# Patient Record
Sex: MALE | Race: WHITE | NOT HISPANIC OR LATINO | Employment: UNEMPLOYED | URBAN - METROPOLITAN AREA
[De-identification: names, ages, dates, MRNs, and addresses within clinical notes are randomized per-mention and may not be internally consistent; named-entity substitution may affect disease eponyms.]

---

## 2019-08-11 ENCOUNTER — HOSPITAL ENCOUNTER (EMERGENCY)
Facility: HOSPITAL | Age: 54
Discharge: HOME | End: 2019-08-11
Attending: EMERGENCY MEDICINE | Admitting: EMERGENCY MEDICINE
Payer: COMMERCIAL

## 2019-08-11 VITALS
HEART RATE: 62 BPM | OXYGEN SATURATION: 97 % | DIASTOLIC BLOOD PRESSURE: 83 MMHG | SYSTOLIC BLOOD PRESSURE: 146 MMHG | BODY MASS INDEX: 26.68 KG/M2 | RESPIRATION RATE: 23 BRPM | HEIGHT: 62 IN | TEMPERATURE: 99.2 F | WEIGHT: 145 LBS

## 2019-08-11 DIAGNOSIS — Z91.14 NON COMPLIANCE W MEDICATION REGIMEN: ICD-10-CM

## 2019-08-11 DIAGNOSIS — G40.919 BREAKTHROUGH SEIZURE (HCC): Primary | ICD-10-CM

## 2019-08-11 LAB
ALBUMIN SERPL BCP-MCNC: 3.9 G/DL (ref 3.5–5)
ALP SERPL-CCNC: 124 U/L (ref 46–116)
ALT SERPL W P-5'-P-CCNC: 21 U/L (ref 12–78)
ANION GAP SERPL CALCULATED.3IONS-SCNC: 10 MMOL/L (ref 4–13)
AST SERPL W P-5'-P-CCNC: 35 U/L (ref 5–45)
BASOPHILS # BLD AUTO: 0.04 THOUSANDS/ΜL (ref 0–0.1)
BASOPHILS NFR BLD AUTO: 1 % (ref 0–1)
BILIRUB SERPL-MCNC: 0.4 MG/DL (ref 0.2–1)
BUN SERPL-MCNC: 9 MG/DL (ref 5–25)
CALCIUM SERPL-MCNC: 9.2 MG/DL (ref 8.3–10.1)
CARBAMAZEPINE SERPL-MCNC: 1.2 UG/ML (ref 4–12)
CHLORIDE SERPL-SCNC: 102 MMOL/L (ref 100–108)
CO2 SERPL-SCNC: 28 MMOL/L (ref 21–32)
CREAT SERPL-MCNC: 0.92 MG/DL (ref 0.6–1.3)
EOSINOPHIL # BLD AUTO: 0.24 THOUSAND/ΜL (ref 0–0.61)
EOSINOPHIL NFR BLD AUTO: 4 % (ref 0–6)
ERYTHROCYTE [DISTWIDTH] IN BLOOD BY AUTOMATED COUNT: 13.4 % (ref 11.6–15.1)
GFR SERPL CREATININE-BSD FRML MDRD: 95 ML/MIN/1.73SQ M
GLUCOSE SERPL-MCNC: 115 MG/DL (ref 65–140)
HCT VFR BLD AUTO: 39.7 % (ref 36.5–49.3)
HGB BLD-MCNC: 13.3 G/DL (ref 12–17)
IMM GRANULOCYTES # BLD AUTO: 0.02 THOUSAND/UL (ref 0–0.2)
IMM GRANULOCYTES NFR BLD AUTO: 0 % (ref 0–2)
LYMPHOCYTES # BLD AUTO: 2.67 THOUSANDS/ΜL (ref 0.6–4.47)
LYMPHOCYTES NFR BLD AUTO: 41 % (ref 14–44)
MCH RBC QN AUTO: 34.5 PG (ref 26.8–34.3)
MCHC RBC AUTO-ENTMCNC: 33.5 G/DL (ref 31.4–37.4)
MCV RBC AUTO: 103 FL (ref 82–98)
MONOCYTES # BLD AUTO: 0.52 THOUSAND/ΜL (ref 0.17–1.22)
MONOCYTES NFR BLD AUTO: 8 % (ref 4–12)
NEUTROPHILS # BLD AUTO: 3.06 THOUSANDS/ΜL (ref 1.85–7.62)
NEUTS SEG NFR BLD AUTO: 46 % (ref 43–75)
NRBC BLD AUTO-RTO: 0 /100 WBCS
PHENOBARB SERPL-MCNC: 18.5 UG/ML (ref 15–40)
PLATELET # BLD AUTO: 243 THOUSANDS/UL (ref 149–390)
PMV BLD AUTO: 10.7 FL (ref 8.9–12.7)
POTASSIUM SERPL-SCNC: 4.2 MMOL/L (ref 3.5–5.3)
PROT SERPL-MCNC: 7.7 G/DL (ref 6.4–8.2)
RBC # BLD AUTO: 3.85 MILLION/UL (ref 3.88–5.62)
SODIUM SERPL-SCNC: 140 MMOL/L (ref 136–145)
WBC # BLD AUTO: 6.55 THOUSAND/UL (ref 4.31–10.16)

## 2019-08-11 PROCEDURE — 36415 COLL VENOUS BLD VENIPUNCTURE: CPT | Performed by: PHYSICIAN ASSISTANT

## 2019-08-11 PROCEDURE — 93005 ELECTROCARDIOGRAM TRACING: CPT

## 2019-08-11 PROCEDURE — 96374 THER/PROPH/DIAG INJ IV PUSH: CPT

## 2019-08-11 PROCEDURE — 96361 HYDRATE IV INFUSION ADD-ON: CPT

## 2019-08-11 PROCEDURE — 80053 COMPREHEN METABOLIC PANEL: CPT | Performed by: PHYSICIAN ASSISTANT

## 2019-08-11 PROCEDURE — 99284 EMERGENCY DEPT VISIT MOD MDM: CPT | Performed by: PHYSICIAN ASSISTANT

## 2019-08-11 PROCEDURE — 99284 EMERGENCY DEPT VISIT MOD MDM: CPT

## 2019-08-11 PROCEDURE — 85025 COMPLETE CBC W/AUTO DIFF WBC: CPT | Performed by: PHYSICIAN ASSISTANT

## 2019-08-11 PROCEDURE — 80156 ASSAY CARBAMAZEPINE TOTAL: CPT | Performed by: PHYSICIAN ASSISTANT

## 2019-08-11 PROCEDURE — 80184 ASSAY OF PHENOBARBITAL: CPT | Performed by: PHYSICIAN ASSISTANT

## 2019-08-11 RX ORDER — LOSARTAN POTASSIUM 50 MG/1
50 TABLET ORAL DAILY
Qty: 7 TABLET | Refills: 0 | Status: SHIPPED | OUTPATIENT
Start: 2019-08-11 | End: 2019-09-16 | Stop reason: SDUPTHER

## 2019-08-11 RX ORDER — ACETAMINOPHEN 325 MG/1
975 TABLET ORAL ONCE
Status: COMPLETED | OUTPATIENT
Start: 2019-08-11 | End: 2019-08-11

## 2019-08-11 RX ORDER — PHENOBARBITAL SODIUM 65 MG/ML
130 INJECTION INTRAMUSCULAR ONCE
Status: COMPLETED | OUTPATIENT
Start: 2019-08-11 | End: 2019-08-11

## 2019-08-11 RX ORDER — CARBAMAZEPINE 100 MG/1
100 TABLET, EXTENDED RELEASE ORAL 2 TIMES DAILY
COMMUNITY
End: 2019-08-11 | Stop reason: SDUPTHER

## 2019-08-11 RX ORDER — LOSARTAN POTASSIUM 50 MG/1
50 TABLET ORAL DAILY
COMMUNITY
End: 2019-08-11 | Stop reason: SDUPTHER

## 2019-08-11 RX ORDER — PHENOBARBITAL 97.2 MG/1
97.2 TABLET ORAL 2 TIMES DAILY
Qty: 14 TABLET | Refills: 0 | Status: SHIPPED | OUTPATIENT
Start: 2019-08-11 | End: 2019-09-16 | Stop reason: SDUPTHER

## 2019-08-11 RX ORDER — LOSARTAN POTASSIUM 50 MG/1
50 TABLET ORAL ONCE
Status: COMPLETED | OUTPATIENT
Start: 2019-08-11 | End: 2019-08-11

## 2019-08-11 RX ORDER — CARBAMAZEPINE 100 MG/1
100 TABLET, EXTENDED RELEASE ORAL 2 TIMES DAILY
Qty: 14 TABLET | Refills: 0 | Status: SHIPPED | OUTPATIENT
Start: 2019-08-11 | End: 2019-09-16 | Stop reason: SDUPTHER

## 2019-08-11 RX ORDER — CARBAMAZEPINE 200 MG/1
200 TABLET ORAL ONCE
Status: COMPLETED | OUTPATIENT
Start: 2019-08-11 | End: 2019-08-11

## 2019-08-11 RX ORDER — PHENOBARBITAL 97.2 MG/1
97.2 TABLET ORAL 2 TIMES DAILY
COMMUNITY
End: 2019-08-11 | Stop reason: SDUPTHER

## 2019-08-11 RX ADMIN — PHENOBARBITAL SODIUM 130 MG: 65 INJECTION INTRAMUSCULAR; INTRAVENOUS at 19:06

## 2019-08-11 RX ADMIN — SODIUM CHLORIDE 1000 ML: 0.9 INJECTION, SOLUTION INTRAVENOUS at 18:16

## 2019-08-11 RX ADMIN — CARBAMAZEPINE 200 MG: 200 TABLET ORAL at 19:05

## 2019-08-11 RX ADMIN — ACETAMINOPHEN 975 MG: 325 TABLET, FILM COATED ORAL at 18:19

## 2019-08-11 RX ADMIN — LOSARTAN POTASSIUM 50 MG: 50 TABLET, FILM COATED ORAL at 20:12

## 2019-08-11 NOTE — ED PROVIDER NOTES
History  Chief Complaint   Patient presents with    Seizure - Prior Hx Of     Alma Rosa noted that  he had a seizure lasting 10 minutes, Patient states he ran out of his tegretol, phenobarbitol, and losartin  This the first seizure since about a year ago     47 yo male w/ hx of HTN and seizures presents to the Emergency Department for evaluation of breakthrough seizure  He has been out of carbamazepine and phenobarbitol for 2 days as his meds were not properly refilled  Seizure was witnessed by family, described as confusion with staggering gait, no definitive tonic/clonic activity  Currently reports headache, no other symptoms  Does not have a neurologist  Resides in [place], [place], here for a few days for work  No current chest pain, vision changes, N/V, or myalgia  Denies illicit substance use  Prior to Admission Medications   Prescriptions Last Dose Informant Patient Reported? Taking? PHENobarbital (LUMINAL) 97 2 MG tablet   Yes Yes   Sig: Take 97 2 mg by mouth 2 (two) times a day   PHENobarbital (LUMINAL) 97 2 MG tablet   No No   Sig: Take 1 tablet (97 2 mg total) by mouth 2 (two) times a day for 7 days   carBAMazepine (TEGretol XR) 100 mg 12 hr tablet   Yes Yes   Sig: Take 100 mg by mouth 2 (two) times a day   carBAMazepine (TEGretol XR) 100 mg 12 hr tablet   No No   Sig: Take 1 tablet (100 mg total) by mouth 2 (two) times a day for 7 days   losartan (COZAAR) 50 mg tablet   Yes Yes   Sig: Take 50 mg by mouth daily   losartan (COZAAR) 50 mg tablet   No No   Sig: Take 1 tablet (50 mg total) by mouth daily for 7 days      Facility-Administered Medications: None       Past Medical History:   Diagnosis Date    Hypertension     Seizures (HCC)        Past Surgical History:   Procedure Laterality Date    KNEE ARTHROSCOPY Right        History reviewed  No pertinent family history  I have reviewed and agree with the history as documented      Social History     Tobacco Use    Smoking status: Current Some Day Smoker    Smokeless tobacco: Never Used   Substance Use Topics    Alcohol use: Never     Frequency: Never    Drug use: Never        Review of Systems   Constitutional: Negative for chills, diaphoresis and fever  Eyes: Negative for visual disturbance  Respiratory: Negative for cough and shortness of breath  Cardiovascular: Negative for chest pain and palpitations  Gastrointestinal: Negative for abdominal pain, diarrhea, nausea and vomiting  Genitourinary: Negative for dysuria, flank pain and frequency  Musculoskeletal: Negative for arthralgias and myalgias  Skin: Negative for color change, rash and wound  Allergic/Immunologic: Negative for immunocompromised state  Neurological: Positive for seizures and headaches  Negative for dizziness, weakness and light-headedness  Hematological: Does not bruise/bleed easily  Psychiatric/Behavioral: Negative for confusion  The patient is not nervous/anxious  Physical Exam  Physical Exam   Constitutional: He is oriented to person, place, and time  He appears well-developed and well-nourished  No distress  HENT:   Head: Normocephalic and atraumatic  Eyes: Pupils are equal, round, and reactive to light  No scleral icterus  Neck: No JVD present  Cardiovascular: Normal rate and regular rhythm  Exam reveals no gallop and no friction rub  No murmur heard  Pulmonary/Chest: No respiratory distress  He has no wheezes  He has no rales  Abdominal: Soft  Bowel sounds are normal  He exhibits no distension and no mass  There is no tenderness  There is no guarding  Neurological: He is alert and oriented to person, place, and time  No cranial nerve deficit  Cerebellar normal, no DDK or dysmetria  BUE and BLE sensation and strength intact in all fields and symmetric   Skin: Skin is warm and dry  Capillary refill takes less than 2 seconds  He is not diaphoretic  Psychiatric: He has a normal mood and affect   His behavior is normal    Vitals reviewed        Vital Signs  ED Triage Vitals [08/11/19 1804]   Temperature Pulse Respirations Blood Pressure SpO2   99 2 °F (37 3 °C) 85 20 (!) 190/96 96 %      Temp src Heart Rate Source Patient Position - Orthostatic VS BP Location FiO2 (%)   -- -- -- -- --      Pain Score       7           Vitals:    08/11/19 1845 08/11/19 1900 08/11/19 1945 08/11/19 2000   BP: (!) 174/92 170/83 160/92 146/83   Pulse: 67 80 64 62         Visual Acuity  Visual Acuity      Most Recent Value   L Pupil Size (mm)  4   R Pupil Size (mm)  4          ED Medications  Medications   sodium chloride 0 9 % bolus 1,000 mL (0 mL Intravenous Stopped 8/11/19 2011)   acetaminophen (TYLENOL) tablet 975 mg (975 mg Oral Given 8/11/19 1819)   PHENobarbital 65 mg/mL injection 130 mg (130 mg Intravenous Given 8/11/19 1906)   carBAMazepine (TEGretol) tablet 200 mg (200 mg Oral Given 8/11/19 1905)   losartan (COZAAR) tablet 50 mg (50 mg Oral Given 8/11/19 2012)       Diagnostic Studies  Results Reviewed     Procedure Component Value Units Date/Time    Phenobarbital level [[id number]]  (Normal) Collected:  08/11/19 1811    Lab Status:  Final result Specimen:  Blood from Arm, Left Updated:  08/11/19 2133     Phenobarbital Lvl 18 5 ug/mL     Comprehensive metabolic panel [[id number]]  (Abnormal) Collected:  08/11/19 1811    Lab Status:  Final result Specimen:  Blood from Arm, Left Updated:  08/11/19 1834     Sodium 140 mmol/L      Potassium 4 2 mmol/L      Chloride 102 mmol/L      CO2 28 mmol/L      ANION GAP 10 mmol/L      BUN 9 mg/dL      Creatinine 0 92 mg/dL      Glucose 115 mg/dL      Calcium 9 2 mg/dL      AST 35 U/L      ALT 21 U/L      Alkaline Phosphatase 124 U/L      Total Protein 7 7 g/dL      Albumin 3 9 g/dL      Total Bilirubin 0 40 mg/dL      eGFR 95 ml/min/1 73sq m     Narrative:       [place] guidelines for Chronic Kidney Disease (CKD):     Stage 1 with normal or high GFR (GFR > 90 mL/min/1 73 square meters)   Stage 2 Mild CKD (GFR = 60-89 mL/min/1 73 square meters)    Stage 3A Moderate CKD (GFR = 45-59 mL/min/1 73 square meters)    Stage 3B Moderate CKD (GFR = 30-44 mL/min/1 73 square meters)    Stage 4 Severe CKD (GFR = 15-29 mL/min/1 73 square meters)    Stage 5 End Stage CKD (GFR <15 mL/min/1 73 square meters)  Note: GFR calculation is accurate only with a steady state creatinine    Carbamazepine level, total [[id number]]  (Abnormal) Collected:  08/11/19 1811    Lab Status:  Final result Specimen:  Blood from Arm, Left Updated:  08/11/19 1834     Carbamazepine Lvl 1 2 ug/mL     CBC and differential [[id number]]  (Abnormal) Collected:  08/11/19 1811    Lab Status:  Final result Specimen:  Blood from Arm, Left Updated:  08/11/19 1818     WBC 6 55 Thousand/uL      RBC 3 85 Million/uL      Hemoglobin 13 3 g/dL      Hematocrit 39 7 %       fL      MCH 34 5 pg      MCHC 33 5 g/dL      RDW 13 4 %      MPV 10 7 fL      Platelets 303 Thousands/uL      nRBC 0 /100 WBCs      Neutrophils Relative 46 %      Immat GRANS % 0 %      Lymphocytes Relative 41 %      Monocytes Relative 8 %      Eosinophils Relative 4 %      Basophils Relative 1 %      Neutrophils Absolute 3 06 Thousands/µL      Immature Grans Absolute 0 02 Thousand/uL      Lymphocytes Absolute 2 67 Thousands/µL      Monocytes Absolute 0 52 Thousand/µL      Eosinophils Absolute 0 24 Thousand/µL      Basophils Absolute 0 04 Thousands/µL                  No orders to display              Procedures  ECG 12 Lead Documentation Only  Date/Time: 8/11/2019 6:20 PM  Performed by: Radha Pressley PA-C  Authorized by: Radha Pressley PA-C     Indications / Diagnosis:  Seizure  ECG reviewed by me, the ED Provider: yes    Patient location:  ED  Previous ECG:     Previous ECG:  Unavailable  Interpretation:     Interpretation: normal    Rate:     ECG rate:  77    ECG rate assessment: normal    Rhythm:     Rhythm: sinus rhythm      Rhythm comment:  Sinus arrhythmia  Ectopy: Ectopy: none    QRS:     QRS axis:  Normal    QRS intervals:  Normal  Conduction:     Conduction: normal    ST segments:     ST segments:  Normal  T waves:     T waves: normal             ED Course                               MDM  Number of Diagnoses or Management Options  Breakthrough seizure ([facility] [address] ): new and requires workup  Non compliance w medication regimen: new and requires workup  Diagnosis management comments: 47 yo male presents w/ breakthrough seizure secondary to medication non compliance  Given loading dose of phenobarb in the ED as well as daily dose of Tegretol, will discharge with 1 week of medication refill  No seizure activity observed in the ED  Amount and/or Complexity of Data Reviewed  Clinical lab tests: ordered and reviewed  Tests in the medicine section of CPT®: ordered and reviewed  Review and summarize past medical records: yes  Independent visualization of images, tracings, or specimens: yes        Disposition  Final diagnoses:   Breakthrough seizure ([facility] [address] )   Non compliance w medication regimen     Time reflects when diagnosis was documented in both MDM as applicable and the Disposition within this note     Time User Action Codes Description Comment    8/11/2019  7:35 PM Aurelio Salwendim Add [G40 919] Breakthrough seizure ([facility] [address] )     8/11/2019  7:36 PM Groveland Salaam Add [Z91 14] Non compliance w medication regimen       ED Disposition     ED Disposition Condition Date/Time Comment    Discharge Stable Sun Aug 11, 2019  7:35 PM Sarah Verduzco discharge to home/self care              Follow-up Information     Follow up With Specialties Details Why Contact Info Additional Information    [facility] Emergency Department Emergency Medicine  If symptoms worsen [address]  [address] [facility] [address] ED, [address], [address]    Your Primary Care Physician               Discharge Medication List as of 8/11/2019  7:38 PM      CONTINUE these medications which have CHANGED    Details   carBAMazepine (TEGretol XR) 100 mg 12 hr tablet Take 1 tablet (100 mg total) by mouth 2 (two) times a day for 7 days, Starting Sun 8/11/2019, Until Sun 8/18/2019, Print      losartan (COZAAR) 50 mg tablet Take 1 tablet (50 mg total) by mouth daily for 7 days, Starting Sun 8/11/2019, Until Sun 8/18/2019, Print      PHENobarbital (LUMINAL) 97 2 MG tablet Take 1 tablet (97 2 mg total) by mouth 2 (two) times a day for 7 days, Starting Sun 8/11/2019, Until Sun 8/18/2019, Print           No discharge procedures on file      ED Provider  Electronically Signed by           Janine Garcia PA-C  08/12/19 2159

## 2019-08-12 LAB
ATRIAL RATE: 77 BPM
P AXIS: 86 DEGREES
PR INTERVAL: 186 MS
QRS AXIS: 80 DEGREES
QRSD INTERVAL: 82 MS
QT INTERVAL: 362 MS
QTC INTERVAL: 409 MS
T WAVE AXIS: 75 DEGREES
VENTRICULAR RATE: 77 BPM

## 2019-08-12 PROCEDURE — 93010 ELECTROCARDIOGRAM REPORT: CPT | Performed by: INTERNAL MEDICINE

## 2019-08-12 NOTE — ED NOTES
Pt discharged after having instructions reviewed  Pt verbalized understanding of follow up care        Feli Cornell RN  08/11/19 2018

## 2019-09-01 ENCOUNTER — HOSPITAL ENCOUNTER (EMERGENCY)
Facility: HOSPITAL | Age: 54
Discharge: HOME | End: 2019-09-01
Attending: EMERGENCY MEDICINE | Admitting: EMERGENCY MEDICINE
Payer: COMMERCIAL

## 2019-09-01 ENCOUNTER — APPOINTMENT (EMERGENCY)
Dept: CT IMAGING | Facility: HOSPITAL | Age: 54
End: 2019-09-01
Payer: COMMERCIAL

## 2019-09-01 VITALS
SYSTOLIC BLOOD PRESSURE: 147 MMHG | WEIGHT: 145 LBS | RESPIRATION RATE: 16 BRPM | DIASTOLIC BLOOD PRESSURE: 88 MMHG | OXYGEN SATURATION: 98 % | HEIGHT: 62 IN | TEMPERATURE: 95.9 F | BODY MASS INDEX: 26.68 KG/M2 | HEART RATE: 80 BPM

## 2019-09-01 DIAGNOSIS — S09.90XA INJURY OF HEAD, INITIAL ENCOUNTER: ICD-10-CM

## 2019-09-01 DIAGNOSIS — M79.18 LEFT BUTTOCK PAIN: ICD-10-CM
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
## 2019-09-01 DIAGNOSIS — M51.26 HERNIATED INTERVERTEBRAL DISC OF LUMBAR SPINE: ICD-10-CM

## 2019-09-01 DIAGNOSIS — M54.16 LUMBAR RADICULOPATHY, ACUTE: ICD-10-CM

## 2019-09-01 LAB
ANION GAP SERPL CALCULATED.3IONS-SCNC: 7 MMOL/L (ref 4–13)
BASOPHILS # BLD AUTO: 0.04 THOUSANDS/ΜL (ref 0–0.1)
BASOPHILS NFR BLD AUTO: 1 % (ref 0–1)
BUN SERPL-MCNC: 8 MG/DL (ref 5–25)
CALCIUM SERPL-MCNC: 8.5 MG/DL (ref 8.3–10.1)
CHLORIDE SERPL-SCNC: 106 MMOL/L (ref 100–108)
CO2 SERPL-SCNC: 32 MMOL/L (ref 21–32)
CREAT SERPL-MCNC: 0.99 MG/DL (ref 0.6–1.3)
EOSINOPHIL # BLD AUTO: 0.64 THOUSAND/ΜL (ref 0–0.61)
EOSINOPHIL NFR BLD AUTO: 12 % (ref 0–6)
ERYTHROCYTE [DISTWIDTH] IN BLOOD BY AUTOMATED COUNT: 12.8 % (ref 11.6–15.1)
GFR SERPL CREATININE-BSD FRML MDRD: 87 ML/MIN/1.73SQ M
GLUCOSE SERPL-MCNC: 84 MG/DL (ref 65–140)
HCT VFR BLD AUTO: 44.8 % (ref 36.5–49.3)
HGB BLD-MCNC: 15.6 G/DL (ref 12–17)
IMM GRANULOCYTES # BLD AUTO: 0.01 THOUSAND/UL (ref 0–0.2)
IMM GRANULOCYTES NFR BLD AUTO: 0 % (ref 0–2)
LYMPHOCYTES # BLD AUTO: 2.27 THOUSANDS/ΜL (ref 0.6–4.47)
LYMPHOCYTES NFR BLD AUTO: 41 % (ref 14–44)
MCH RBC QN AUTO: 34.3 PG (ref 26.8–34.3)
MCHC RBC AUTO-ENTMCNC: 34.8 G/DL (ref 31.4–37.4)
MCV RBC AUTO: 99 FL (ref 82–98)
MONOCYTES # BLD AUTO: 0.42 THOUSAND/ΜL (ref 0.17–1.22)
MONOCYTES NFR BLD AUTO: 8 % (ref 4–12)
NEUTROPHILS # BLD AUTO: 2.08 THOUSANDS/ΜL (ref 1.85–7.62)
NEUTS SEG NFR BLD AUTO: 38 % (ref 43–75)
NRBC BLD AUTO-RTO: 0 /100 WBCS
PLATELET # BLD AUTO: 256 THOUSANDS/UL (ref 149–390)
PMV BLD AUTO: 9.5 FL (ref 8.9–12.7)
POTASSIUM SERPL-SCNC: 4.2 MMOL/L (ref 3.5–5.3)
RBC # BLD AUTO: 4.55 MILLION/UL (ref 3.88–5.62)
SODIUM SERPL-SCNC: 145 MMOL/L (ref 136–145)
WBC # BLD AUTO: 5.46 THOUSAND/UL (ref 4.31–10.16)

## 2019-09-01 PROCEDURE — 85025 COMPLETE CBC W/AUTO DIFF WBC: CPT | Performed by: EMERGENCY MEDICINE

## 2019-09-01 PROCEDURE — 72125 CT NECK SPINE W/O DYE: CPT

## 2019-09-01 PROCEDURE — 80048 BASIC METABOLIC PNL TOTAL CA: CPT | Performed by: EMERGENCY MEDICINE

## 2019-09-01 PROCEDURE — 99285 EMERGENCY DEPT VISIT HI MDM: CPT | Performed by: EMERGENCY MEDICINE

## 2019-09-01 PROCEDURE — 96374 THER/PROPH/DIAG INJ IV PUSH: CPT

## 2019-09-01 PROCEDURE — 36415 COLL VENOUS BLD VENIPUNCTURE: CPT | Performed by: EMERGENCY MEDICINE

## 2019-09-01 PROCEDURE — 73700 CT LOWER EXTREMITY W/O DYE: CPT

## 2019-09-01 PROCEDURE — 99284 EMERGENCY DEPT VISIT MOD MDM: CPT

## 2019-09-01 PROCEDURE — 70450 CT HEAD/BRAIN W/O DYE: CPT

## 2019-09-01 PROCEDURE — 72131 CT LUMBAR SPINE W/O DYE: CPT

## 2019-09-01 RX ORDER — PREDNISONE 20 MG/1
60 TABLET ORAL ONCE
Status: COMPLETED | OUTPATIENT
Start: 2019-09-01 | End: 2019-09-01

## 2019-09-01 RX ORDER — MORPHINE SULFATE 4 MG/ML
4 INJECTION, SOLUTION INTRAMUSCULAR; INTRAVENOUS ONCE
Status: COMPLETED | OUTPATIENT
Start: 2019-09-01 | End: 2019-09-01

## 2019-09-01 RX ORDER — LOSARTAN POTASSIUM 50 MG/1
50 TABLET ORAL ONCE
Status: DISCONTINUED | OUTPATIENT
Start: 2019-09-01 | End: 2019-09-01

## 2019-09-01 RX ORDER — TRAMADOL HYDROCHLORIDE 50 MG/1
50 TABLET ORAL EVERY 6 HOURS PRN
Qty: 20 TABLET | Refills: 0 | Status: SHIPPED | OUTPATIENT
Start: 2019-09-01 | End: 2019-09-11

## 2019-09-01 RX ORDER — ACETAMINOPHEN 325 MG/1
975 TABLET ORAL ONCE
Status: COMPLETED | OUTPATIENT
Start: 2019-09-01 | End: 2019-09-01

## 2019-09-01 RX ORDER — PREDNISONE 20 MG/1
60 TABLET ORAL DAILY
Qty: 12 TABLET | Refills: 0 | Status: SHIPPED | OUTPATIENT
Start: 2019-09-02 | End: 2019-09-16 | Stop reason: SDUPTHER

## 2019-09-01 RX ORDER — LIDOCAINE 50 MG/G
1 PATCH TOPICAL ONCE
Status: DISCONTINUED | OUTPATIENT
Start: 2019-09-01 | End: 2019-09-01 | Stop reason: HOSPADM

## 2019-09-01 RX ADMIN — PREDNISONE 60 MG: 20 TABLET ORAL at 12:08

## 2019-09-01 RX ADMIN — MORPHINE SULFATE 4 MG: 4 INJECTION INTRAVENOUS at 11:39

## 2019-09-01 RX ADMIN — ACETAMINOPHEN 975 MG: 325 TABLET, FILM COATED ORAL at 10:38

## 2019-09-01 RX ADMIN — LIDOCAINE 1 PATCH: 50 PATCH TOPICAL at 11:40

## 2019-09-01 NOTE — ED PROVIDER NOTES
History  Chief Complaint   Patient presents with    Hip Pain     pt presents to ED via EMS from home following a fall down steps and c/o left hip pain rates pain 10/10     Patient brought in by ambulance for fall at home on outside stairway slipped and fell about 12 feet down steps has left hip pain  Patient is poor historian and did not notify paramedics that he had passed out and has headache where he may have hit his head  He was able to get up and walk at scene  He denies current alcohol use  Patient complains of severe left hip pain constant with associated numbness left foot  He denies loss of bowel or bladder control, no new weakness  Prior to Admission Medications   Prescriptions Last Dose Informant Patient Reported? Taking? PHENobarbital (LUMINAL) 97 2 MG tablet 8/31/2019 at Unknown time  No Yes   Sig: Take 1 tablet (97 2 mg total) by mouth 2 (two) times a day for 7 days   carBAMazepine (TEGretol XR) 100 mg 12 hr tablet 8/31/2019 at Unknown time  No Yes   Sig: Take 1 tablet (100 mg total) by mouth 2 (two) times a day for 7 days   losartan (COZAAR) 50 mg tablet 8/31/2019 at Unknown time  No Yes   Sig: Take 1 tablet (50 mg total) by mouth daily for 7 days      Facility-Administered Medications: None       Past Medical History:   Diagnosis Date    Depression     Hypertension     Seizures (HCC)     TBI (traumatic brain injury) ([facility] [address] ) 1989       Past Surgical History:   Procedure Laterality Date    KNEE ARTHROSCOPY Right        History reviewed  No pertinent family history  I have reviewed and agree with the history as documented  Social History     Tobacco Use    Smoking status: Current Some Day Smoker    Smokeless tobacco: Never Used   Substance Use Topics    Alcohol use: Never     Frequency: Never    Drug use: Never        Review of Systems   All other systems reviewed and are negative  Physical Exam  Physical Exam   Constitutional: He is oriented to person, place, and time  He appears well-developed and well-nourished  HENT:   Head: Head is without contusion  Right Ear: Tympanic membrane normal    Left Ear: Tympanic membrane normal    Mouth/Throat: Oropharynx is clear and moist    Eyes: Pupils are equal, round, and reactive to light  Conjunctivae and EOM are normal    Neck: Normal range of motion  Neck supple  No spinous process tenderness present  Cardiovascular: Normal rate, regular rhythm, normal heart sounds and intact distal pulses  Pulmonary/Chest: Effort normal and breath sounds normal  No respiratory distress  He has no wheezes  Abdominal: Soft  Bowel sounds are normal  He exhibits no distension  There is no tenderness  Musculoskeletal: Normal range of motion  Left hip: He exhibits tenderness and bony tenderness  He exhibits normal range of motion, normal strength, no swelling and no deformity  Cervical back: He exhibits tenderness, bony tenderness and pain  He exhibits normal range of motion, no spasm and normal pulse  Thoracic back: He exhibits no tenderness and no bony tenderness  Lumbar back: He exhibits pain and spasm  He exhibits no tenderness and no bony tenderness  Legs:  +2 dp and pt pulses bilateral   Neurological: He is alert and oriented to person, place, and time  He has normal strength  No sensory deficit  GCS eye subscore is 4  GCS verbal subscore is 5  GCS motor subscore is 6  Decreased sensation light touch and pinprick both feet and left shin    Left foot weak dorsiflexion   Skin: Skin is warm and dry  No rash noted  Psychiatric: He has a normal mood and affect  Nursing note and vitals reviewed        Vital Signs  ED Triage Vitals   Temperature Pulse Respirations Blood Pressure SpO2   09/01/19 1015 09/01/19 1015 09/01/19 1015 09/01/19 1015 09/01/19 1015   (!) 95 9 °F (35 5 °C) (!) 112 18 132/99 100 %      Temp Source Heart Rate Source Patient Position - Orthostatic VS BP Location FiO2 (%)   09/01/19 1015 09/01/19 1201 09/01/19 1015 09/01/19 1015 --   Temporal Monitor Sitting Left arm       Pain Score       09/01/19 1015       Worst Possible Pain           Vitals:    09/01/19 1015 09/01/19 1130 09/01/19 1201   BP: 132/99 (!) 164/110 147/88   Pulse: (!) 112 94 80   Patient Position - Orthostatic VS: Sitting  Lying         Visual Acuity      ED Medications  Medications   acetaminophen (TYLENOL) tablet 975 mg (975 mg Oral Given 9/1/19 1038)   morphine (PF) 4 mg/mL injection 4 mg (4 mg Intravenous Given 9/1/19 1139)   predniSONE tablet 60 mg (60 mg Oral Given 9/1/19 1208)       Diagnostic Studies  Results Reviewed     Procedure Component Value Units Date/Time    Basic metabolic panel [[id number]] Collected:  09/01/19 1043    Lab Status:  Final result Specimen:  Blood from Arm, Right Updated:  09/01/19 1106     Sodium 145 mmol/L      Potassium 4 2 mmol/L      Chloride 106 mmol/L      CO2 32 mmol/L      ANION GAP 7 mmol/L      BUN 8 mg/dL      Creatinine 0 99 mg/dL      Glucose 84 mg/dL      Calcium 8 5 mg/dL      eGFR 87 ml/min/1 73sq m     Narrative:       [place] guidelines for Chronic Kidney Disease (CKD):     Stage 1 with normal or high GFR (GFR > 90 mL/min/1 73 square meters)    Stage 2 Mild CKD (GFR = 60-89 mL/min/1 73 square meters)    Stage 3A Moderate CKD (GFR = 45-59 mL/min/1 73 square meters)    Stage 3B Moderate CKD (GFR = 30-44 mL/min/1 73 square meters)    Stage 4 Severe CKD (GFR = 15-29 mL/min/1 73 square meters)    Stage 5 End Stage CKD (GFR <15 mL/min/1 73 square meters)  Note: GFR calculation is accurate only with a steady state creatinine    CBC and differential [[id number]]  (Abnormal) Collected:  09/01/19 1043    Lab Status:  Final result Specimen:  Blood from Arm, Right Updated:  09/01/19 1054     WBC 5 46 Thousand/uL      RBC 4 55 Million/uL      Hemoglobin 15 6 g/dL      Hematocrit 44 8 %      MCV 99 fL      MCH 34 3 pg      MCHC 34 8 g/dL      RDW 12 8 %      MPV 9 5 fL      Platelets 432 Thousands/uL      nRBC 0 /100 WBCs      Neutrophils Relative 38 %      Immat GRANS % 0 %      Lymphocytes Relative 41 %      Monocytes Relative 8 %      Eosinophils Relative 12 %      Basophils Relative 1 %      Neutrophils Absolute 2 08 Thousands/µL      Immature Grans Absolute 0 01 Thousand/uL      Lymphocytes Absolute 2 27 Thousands/µL      Monocytes Absolute 0 42 Thousand/µL      Eosinophils Absolute 0 64 Thousand/µL      Basophils Absolute 0 04 Thousands/µL                  CT hip left without contrast   Final Result by Antonette Alejo DO (09/01 1144)   No evidence of acute fracture  Workstation performed: [id number]         TRAUMA - CT spine lumbar wo contrast   Final Result by Antonette Alejo DO (09/01 1141)   Degenerative changes of the lumbar spine  Workstation performed: [id number]         TRAUMA - CT spine cervical wo contrast   Final Result by Antonette Alejo DO (09/01 1136)   Moderate degenerative changes  No acute cervical spine fracture or traumatic malalignment  Workstation performed: [id number]         TRAUMA - CT head wo contrast   Final Result by Antonette Alejo DO (09/01 1132)   No acute intracranial abnormality                    Workstation performed: [id number]                    Procedures  Procedures       ED Course                               MDM  Number of Diagnoses or Management Options  Fall, initial encounter: new and requires workup  Herniated intervertebral disc of lumbar spine: new and requires workup  Injury of head, initial encounter: new and requires workup  Left buttock pain: new and requires workup  Lumbar radiculopathy, acute: new and requires workup     Amount and/or Complexity of Data Reviewed  Tests in the radiology section of CPT®: ordered and reviewed  Obtain history from someone other than the patient: yes    Patient Progress  Patient progress: improved      Disposition  Final diagnoses:   Fall, initial encounter   Injury of head, initial encounter   Herniated intervertebral disc of lumbar spine   Left buttock pain   Lumbar radiculopathy, acute     Time reflects when diagnosis was documented in both MDM as applicable and the Disposition within this note     Time User Action Codes Description Comment    9/1/2019 11:54 AM Aditya Maya Add [W52  [facility] Fall, initial encounter     9/1/2019 11:54 AM Brendalygodfrey Maya Add [Q12 43EG] Injury of head, initial encounter     9/1/2019 11:54 AM Brendalyn Cook Add [M51 26] Herniated intervertebral disc of lumbar spine     9/1/2019 11:54 AM Brendalyn Cook Add [M79 18] Left buttock pain     9/1/2019 11:55 AM Brendalyn Cook Add [M54 16] Lumbar radiculopathy, acute       ED Disposition     ED Disposition Condition Date/Time Comment    Discharge Stable Sun Sep 1, 2019 11:54 AM Mer Cohen discharge to home/self care              Follow-up Information     Follow up With Specialties Details Why Contact Info    [employer]  Call in 2 days  [phone]            Discharge Medication List as of 9/1/2019 11:58 AM      START taking these medications    Details   predniSONE 20 mg tablet Take 3 tablets (60 mg total) by mouth daily, Starting Mon 9/2/2019, Normal      traMADol (ULTRAM) 50 mg tablet Take 1 tablet (50 mg total) by mouth every 6 (six) hours as needed for moderate pain for up to 10 days, Starting Sun 9/1/2019, Until Wed 9/11/2019, Normal         CONTINUE these medications which have NOT CHANGED    Details   carBAMazepine (TEGretol XR) 100 mg 12 hr tablet Take 1 tablet (100 mg total) by mouth 2 (two) times a day for 7 days, Starting Sun 8/11/2019, Until Sun 9/1/2019, Print      losartan (COZAAR) 50 mg tablet Take 1 tablet (50 mg total) by mouth daily for 7 days, Starting Sun 8/11/2019, Until Sun 9/1/2019, Print      PHENobarbital (LUMINAL) 97 2 MG tablet Take 1 tablet (97 2 mg total) by mouth 2 (two) times a day for 7 days, Starting Sun 8/11/2019, Until Sun 9/1/2019, Print           No discharge procedures on file      ED Provider  Electronically Signed by           Mayra Raman DO  09/01/19 9120

## 2019-09-01 NOTE — ED NOTES
Pt states he is feeling much better after pain medication       Alejandrina Rowell, SOMMER  09/01/19 9575

## 2019-09-01 NOTE — ED NOTES
Family at bedside  Pt states no medications taken this morning  ETOH smell present in room  Pt states due for BP and Phenobarb dose       Kee Gambino RN  09/01/19 3320

## 2019-09-01 NOTE — ED NOTES
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

## 2019-09-06 ENCOUNTER — HOSPITAL ENCOUNTER (EMERGENCY)
Facility: HOSPITAL | Age: 54
Discharge: HOME | End: 2019-09-06
Attending: EMERGENCY MEDICINE | Admitting: EMERGENCY MEDICINE
Payer: COMMERCIAL

## 2019-09-06 VITALS
OXYGEN SATURATION: 100 % | HEART RATE: 84 BPM | SYSTOLIC BLOOD PRESSURE: 166 MMHG | BODY MASS INDEX: 26.69 KG/M2 | HEIGHT: 62 IN | WEIGHT: 145.06 LBS | TEMPERATURE: 98 F | DIASTOLIC BLOOD PRESSURE: 77 MMHG | RESPIRATION RATE: 21 BRPM

## 2019-09-06 DIAGNOSIS — L03.113 CELLULITIS OF RIGHT FOREARM: Primary | ICD-10-CM

## 2019-09-06 LAB
ALBUMIN SERPL BCP-MCNC: 4.1 G/DL (ref 3.5–5)
ALP SERPL-CCNC: 122 U/L (ref 46–116)
ALT SERPL W P-5'-P-CCNC: 26 U/L (ref 12–78)
ANION GAP SERPL CALCULATED.3IONS-SCNC: 7 MMOL/L (ref 4–13)
AST SERPL W P-5'-P-CCNC: 19 U/L (ref 5–45)
BASOPHILS # BLD AUTO: 0.02 THOUSANDS/ΜL (ref 0–0.1)
BASOPHILS NFR BLD AUTO: 0 % (ref 0–1)
BILIRUB SERPL-MCNC: 0.2 MG/DL (ref 0.2–1)
BUN SERPL-MCNC: 11 MG/DL (ref 5–25)
CALCIUM SERPL-MCNC: 9.4 MG/DL (ref 8.3–10.1)
CHLORIDE SERPL-SCNC: 102 MMOL/L (ref 100–108)
CO2 SERPL-SCNC: 32 MMOL/L (ref 21–32)
CREAT SERPL-MCNC: 1.09 MG/DL (ref 0.6–1.3)
EOSINOPHIL # BLD AUTO: 0.49 THOUSAND/ΜL (ref 0–0.61)
EOSINOPHIL NFR BLD AUTO: 4 % (ref 0–6)
ERYTHROCYTE [DISTWIDTH] IN BLOOD BY AUTOMATED COUNT: 13 % (ref 11.6–15.1)
GFR SERPL CREATININE-BSD FRML MDRD: 77 ML/MIN/1.73SQ M
GLUCOSE SERPL-MCNC: 91 MG/DL (ref 65–140)
HCT VFR BLD AUTO: 42.6 % (ref 36.5–49.3)
HGB BLD-MCNC: 14.5 G/DL (ref 12–17)
IMM GRANULOCYTES # BLD AUTO: 0.05 THOUSAND/UL (ref 0–0.2)
IMM GRANULOCYTES NFR BLD AUTO: 0 % (ref 0–2)
LACTATE SERPL-SCNC: 1.8 MMOL/L (ref 0.5–2)
LYMPHOCYTES # BLD AUTO: 2.8 THOUSANDS/ΜL (ref 0.6–4.47)
LYMPHOCYTES NFR BLD AUTO: 22 % (ref 14–44)
MCH RBC QN AUTO: 34.5 PG (ref 26.8–34.3)
MCHC RBC AUTO-ENTMCNC: 34 G/DL (ref 31.4–37.4)
MCV RBC AUTO: 101 FL (ref 82–98)
MONOCYTES # BLD AUTO: 1.04 THOUSAND/ΜL (ref 0.17–1.22)
MONOCYTES NFR BLD AUTO: 8 % (ref 4–12)
NEUTROPHILS # BLD AUTO: 8.25 THOUSANDS/ΜL (ref 1.85–7.62)
NEUTS SEG NFR BLD AUTO: 66 % (ref 43–75)
NRBC BLD AUTO-RTO: 0 /100 WBCS
PLATELET # BLD AUTO: 235 THOUSANDS/UL (ref 149–390)
PMV BLD AUTO: 10.3 FL (ref 8.9–12.7)
POTASSIUM SERPL-SCNC: 3.8 MMOL/L (ref 3.5–5.3)
PROT SERPL-MCNC: 7.9 G/DL (ref 6.4–8.2)
RBC # BLD AUTO: 4.2 MILLION/UL (ref 3.88–5.62)
SODIUM SERPL-SCNC: 141 MMOL/L (ref 136–145)
WBC # BLD AUTO: 12.65 THOUSAND/UL (ref 4.31–10.16)

## 2019-09-06 PROCEDURE — 85025 COMPLETE CBC W/AUTO DIFF WBC: CPT | Performed by: PHYSICIAN ASSISTANT

## 2019-09-06 PROCEDURE — 83605 ASSAY OF LACTIC ACID: CPT | Performed by: PHYSICIAN ASSISTANT

## 2019-09-06 PROCEDURE — 96368 THER/DIAG CONCURRENT INF: CPT

## 2019-09-06 PROCEDURE — 99283 EMERGENCY DEPT VISIT LOW MDM: CPT

## 2019-09-06 PROCEDURE — 96365 THER/PROPH/DIAG IV INF INIT: CPT

## 2019-09-06 PROCEDURE — 87040 BLOOD CULTURE FOR BACTERIA: CPT | Performed by: PHYSICIAN ASSISTANT

## 2019-09-06 PROCEDURE — 80053 COMPREHEN METABOLIC PANEL: CPT | Performed by: PHYSICIAN ASSISTANT

## 2019-09-06 PROCEDURE — 96361 HYDRATE IV INFUSION ADD-ON: CPT

## 2019-09-06 PROCEDURE — 36415 COLL VENOUS BLD VENIPUNCTURE: CPT | Performed by: PHYSICIAN ASSISTANT

## 2019-09-06 PROCEDURE — 99284 EMERGENCY DEPT VISIT MOD MDM: CPT | Performed by: PHYSICIAN ASSISTANT

## 2019-09-06 PROCEDURE — 96375 TX/PRO/DX INJ NEW DRUG ADDON: CPT

## 2019-09-06 RX ORDER — KETOROLAC TROMETHAMINE 30 MG/ML
15 INJECTION, SOLUTION INTRAMUSCULAR; INTRAVENOUS ONCE
Status: COMPLETED | OUTPATIENT
Start: 2019-09-06 | End: 2019-09-06

## 2019-09-06 RX ORDER — SULFAMETHOXAZOLE AND TRIMETHOPRIM 800; 160 MG/1; MG/1
1 TABLET ORAL 2 TIMES DAILY
Qty: 20 TABLET | Refills: 0 | Status: SHIPPED | OUTPATIENT
Start: 2019-09-06 | End: 2019-09-16 | Stop reason: ALTCHOICE

## 2019-09-06 RX ORDER — CEFAZOLIN SODIUM 2 G/50ML
2000 SOLUTION INTRAVENOUS ONCE
Status: COMPLETED | OUTPATIENT
Start: 2019-09-06 | End: 2019-09-06

## 2019-09-06 RX ORDER — VANCOMYCIN HYDROCHLORIDE 1 G/200ML
15 INJECTION, SOLUTION INTRAVENOUS ONCE
Status: COMPLETED | OUTPATIENT
Start: 2019-09-06 | End: 2019-09-06

## 2019-09-06 RX ORDER — CEPHALEXIN 500 MG/1
500 CAPSULE ORAL 4 TIMES DAILY
Qty: 28 CAPSULE | Refills: 0 | Status: SHIPPED | OUTPATIENT
Start: 2019-09-06 | End: 2019-09-13

## 2019-09-06 RX ORDER — TRAMADOL HYDROCHLORIDE 50 MG/1
50 TABLET ORAL EVERY 6 HOURS PRN
Qty: 10 TABLET | Refills: 0 | Status: SHIPPED | OUTPATIENT
Start: 2019-09-06 | End: 2019-10-26

## 2019-09-06 RX ORDER — TRAMADOL HYDROCHLORIDE 50 MG/1
50 TABLET ORAL ONCE
Status: COMPLETED | OUTPATIENT
Start: 2019-09-06 | End: 2019-09-06

## 2019-09-06 RX ADMIN — KETOROLAC TROMETHAMINE 15 MG: 30 INJECTION, SOLUTION INTRAMUSCULAR at 20:43

## 2019-09-06 RX ADMIN — TRAMADOL HYDROCHLORIDE 50 MG: 50 TABLET, FILM COATED ORAL at 21:50

## 2019-09-06 RX ADMIN — VANCOMYCIN HYDROCHLORIDE 1000 MG: 1 INJECTION, SOLUTION INTRAVENOUS at 20:51

## 2019-09-06 RX ADMIN — SODIUM CHLORIDE 1000 ML: 0.9 INJECTION, SOLUTION INTRAVENOUS at 20:31

## 2019-09-06 RX ADMIN — CEFAZOLIN SODIUM 2000 MG: 2 SOLUTION INTRAVENOUS at 20:42

## 2019-09-07 NOTE — ED PROVIDER NOTES
History  Chief Complaint   Patient presents with    Arm Swelling     Right arm sweeling and pain which started yesterday  He recently brought his 1year old daughter in for an arm abcess  Daughter required surgery  Patient is a 49 y/o M with h/o HTN, seizures, TBI that presents to the ED with pain, redness and swelling to right arm that started yesterday  He denies injury  He states his daughter had a similar infection and had I&D and is on antibiotics  He denies h/o MRSA  No fevers, but has chills  History provided by:  Patient  Medical Problem   Location:  Right forearm  Severity:  Moderate  Onset quality:  Sudden  Duration:  2 days  Timing:  Constant  Progression:  Worsening  Chronicity:  New  Context:  Patient with redness, swelling, warmth to right forearm  Relieved by:  Nothing  Worsened by:  Nothing  Ineffective treatments:  None tried  Associated symptoms: no fever, no nausea and no vomiting        Prior to Admission Medications   Prescriptions Last Dose Informant Patient Reported? Taking?    PHENobarbital (LUMINAL) 97 2 MG tablet   No No   Sig: Take 1 tablet (97 2 mg total) by mouth 2 (two) times a day for 7 days   carBAMazepine (TEGretol XR) 100 mg 12 hr tablet   No No   Sig: Take 1 tablet (100 mg total) by mouth 2 (two) times a day for 7 days   losartan (COZAAR) 50 mg tablet   No No   Sig: Take 1 tablet (50 mg total) by mouth daily for 7 days   predniSONE 20 mg tablet 9/5/2019 at Unknown time  No Yes   Sig: Take 3 tablets (60 mg total) by mouth daily   traMADol (ULTRAM) 50 mg tablet 9/5/2019 at Unknown time  No Yes   Sig: Take 1 tablet (50 mg total) by mouth every 6 (six) hours as needed for moderate pain for up to 10 days      Facility-Administered Medications: None       Past Medical History:   Diagnosis Date    Depression     Hypertension     [facility] [address]     [facility]       Past Surgical History:   Procedure Laterality Date    KNEE ARTHROSCOPY Right        History reviewed  No pertinent family history  I have reviewed and agree with the history as documented  Social History     Tobacco Use    Smoking status: Current Some Day Smoker     Packs/day: 0 25     Types: Cigarettes    Smokeless tobacco: Never Used   Substance Use Topics    Alcohol use: Never     Frequency: Never    Drug use: Never        Review of Systems   Constitutional: Positive for chills  Negative for fever  Gastrointestinal: Negative for nausea and vomiting  Skin: Positive for color change  Neurological: Negative for dizziness, weakness and numbness  All other systems reviewed and are negative  Physical Exam  Physical Exam   Constitutional: He is oriented to person, place, and time  He appears well-developed and well-nourished  HENT:   Head: Normocephalic  Eyes: Conjunctivae are normal    Neck: Normal range of motion  Cardiovascular: Normal rate, regular rhythm and normal heart sounds  Pulmonary/Chest: Effort normal and breath sounds normal    Musculoskeletal:        Right forearm: He exhibits tenderness and swelling  He exhibits no bony tenderness and no deformity  Neurological: He is alert and oriented to person, place, and time  He has normal strength  No sensory deficit  Skin: Skin is warm and dry  There is erythema  Patient has a small eschar to right forearm with significant surrounding erythema, warmth and tenderness  No fluctuance  Nursing note and vitals reviewed        Vital Signs  ED Triage Vitals [09/06/19 2013]   Temperature Pulse Respirations Blood Pressure SpO2   98 °F (36 7 °C) 105 18 (!) 187/98 100 %      Temp Source Heart Rate Source Patient Position - Orthostatic VS BP Location FiO2 (%)   Tympanic Monitor Sitting Left arm --      Pain Score       8           Vitals:    09/06/19 2013   BP: (!) 187/98   Pulse: 105   Patient Position - Orthostatic VS: Sitting         Visual Acuity      ED Medications  Medications   sodium chloride 0 9 % bolus 1,000 mL (1,000 mL Intravenous New Bag 9/6/19 2031)   ketorolac (TORADOL) injection 15 mg (15 mg Intravenous Given 9/6/19 2043)   ceFAZolin (ANCEF) IVPB (premix) 2,000 mg (0 mg Intravenous Stopped 9/6/19 2129)   vancomycin (VANCOCIN) IVPB (premix) 1,000 mg (0 mg/kg × 65 8 kg Intravenous Stopped 9/6/19 2150)   traMADol (ULTRAM) tablet 50 mg (50 mg Oral Given 9/6/19 2150)       Diagnostic Studies  Results Reviewed     Procedure Component Value Units Date/Time    Lactic acid, plasma [[id number]]  (Normal) Collected:  09/06/19 2033    Lab Status:  Final result Specimen:  Blood from Arm, Left Updated:  09/06/19 2104     LACTIC ACID 1 8 mmol/L     Narrative:       Result may be elevated if tourniquet was used during collection      Comprehensive metabolic panel [[id number]]  (Abnormal) Collected:  09/06/19 2033    Lab Status:  Final result Specimen:  Blood from Arm, Right Updated:  09/06/19 2102     Sodium 141 mmol/L      Potassium 3 8 mmol/L      Chloride 102 mmol/L      CO2 32 mmol/L      ANION GAP 7 mmol/L      BUN 11 mg/dL      Creatinine 1 09 mg/dL      Glucose 91 mg/dL      Calcium 9 4 mg/dL      AST 19 U/L      ALT 26 U/L      Alkaline Phosphatase 122 U/L      Total Protein 7 9 g/dL      Albumin 4 1 g/dL      Total Bilirubin 0 20 mg/dL      eGFR 77 ml/min/1 73sq m     Narrative:       Ramila guidelines for Chronic Kidney Disease (CKD):     Stage 1 with normal or high GFR (GFR > 90 mL/min/1 73 square meters)    Stage 2 Mild CKD (GFR = 60-89 mL/min/1 73 square meters)    Stage 3A Moderate CKD (GFR = 45-59 mL/min/1 73 square meters)    Stage 3B Moderate CKD (GFR = 30-44 mL/min/1 73 square meters)    Stage 4 Severe CKD (GFR = 15-29 mL/min/1 73 square meters)    Stage 5 End Stage CKD (GFR <15 mL/min/1 73 square meters)  Note: GFR calculation is accurate only with a steady state creatinine    CBC and differential [[id number]]  (Abnormal) Collected:  09/06/19 2033    Lab Status:  Final result Specimen:  Blood from Arm, Left Updated:  09/06/19 2043     WBC 12 65 Thousand/uL      RBC 4 20 Million/uL      Hemoglobin 14 5 g/dL      Hematocrit 42 6 %       fL      MCH 34 5 pg      MCHC 34 0 g/dL      RDW 13 0 %      MPV 10 3 fL      Platelets 637 Thousands/uL      nRBC 0 /100 WBCs      Neutrophils Relative 66 %      Immat GRANS % 0 %      Lymphocytes Relative 22 %      Monocytes Relative 8 %      Eosinophils Relative 4 %      Basophils Relative 0 %      Neutrophils Absolute 8 25 Thousands/µL      Immature Grans Absolute 0 05 Thousand/uL      Lymphocytes Absolute 2 80 Thousands/µL      Monocytes Absolute 1 04 Thousand/µL      Eosinophils Absolute 0 49 Thousand/µL      Basophils Absolute 0 02 Thousands/µL     Blood culture #2 [[id number]] Collected:  09/06/19 2033    Lab Status: In process Specimen:  Blood from Arm, Right Updated:  09/06/19 2041    Blood culture #1 [[id number]] Collected:  09/06/19 2037    Lab Status: In process Specimen:  Blood from Arm, Right Updated:  09/06/19 2041                 No orders to display              Procedures  Procedures       ED Course                   Initial Sepsis Screening     Row Name 09/06/19 2110                Is the patient's history suggestive of a new or worsening infection? (!) Yes (Proceed)  -CD        Suspected source of infection  soft tissue  -CD        Are two or more of the following signs & symptoms of infection both present and new to the patient?   (!) Yes (Proceed)  -CD        Indicate SIRS criteria  Tachycardia > 90 bpm;Leukocytosis (WBC > 87992 IJL)  -CD        If the answer is yes to both questions, suspicion of sepsis is present  --        If severe sepsis is present AND tissue hypoperfusion perists in the hour after fluid resuscitation or lactate > 4, the patient meets criteria for SEPTIC SHOCK  --        Are any of the following organ dysfunction criteria present within 6 hours of suspected infection and SIRS criteria that are NOT considered to be chronic conditions? No  -CD        Organ dysfunction  --        Date of presentation of severe sepsis  --        Time of presentation of severe sepsis  --        Tissue hypoperfusion persists in the hour after crystalloid fluid administration, evidenced, by either:  --        Was hypotension present within one hour of the conclusion of crystalloid fluid administration?  --        Date of presentation of septic shock  --        Time of presentation of septic shock  --          User Key  (r) = Recorded By, (t) = Taken By, (c) = Cosigned By    [address] Name Provider Type    CD Zaheer Mercer PA-C Physician Assistant                  MDM  Number of Diagnoses or Management Options  Cellulitis of right forearm: new and requires workup  Diagnosis management comments: Patient with cellulitis right forearm, will check labs, give IV antibiotics  Patient does not meet septic criteria, will treat with oral antibiotics  REturn precautions given  Amount and/or Complexity of Data Reviewed  Clinical lab tests: ordered and reviewed    Patient Progress  Patient progress: stable      Disposition  Final diagnoses:   Cellulitis of right forearm     Time reflects when diagnosis was documented in both MDM as applicable and the Disposition within this note     Time User Action Codes Description Comment    9/6/2019  9:56 PM Harpreet Bishop Add [L03 113] Cellulitis of right forearm       ED Disposition     ED Disposition Condition Date/Time Comment    Discharge Stable Fri Sep 6, 2019  9:55 PM Gallito Rader discharge to home/self care              Follow-up Information     Follow up With Specialties Details Why Contact Info    Brionna Oliver MD Internal Medicine In 2 days For recheck [address] [place] [address]  [phone]            Patient's Medications   Discharge Prescriptions    CEPHALEXIN (KEFLEX) 500 MG CAPSULE    Take 1 capsule (500 mg total) by mouth 4 (four) times a day for 7 days Start Date: 9/6/2019  End Date: 9/13/2019       Order Dose: 500 mg       Quantity: 28 capsule    Refills: 0    SULFAMETHOXAZOLE-TRIMETHOPRIM (BACTRIM DS) 800-160 MG PER TABLET    Take 1 tablet by mouth 2 (two) times a day for 10 days smx-tmp DS (BACTRIM) 800-160 mg tabs (1tab q12 D10)       Start Date: 9/6/2019  End Date: 9/16/2019       Order Dose: 1 tablet       Quantity: 20 tablet    Refills: 0    TRAMADOL (ULTRAM) 50 MG TABLET    Take 1 tablet (50 mg total) by mouth every 6 (six) hours as needed for moderate pain       Start Date: 9/6/2019  End Date: --       Order Dose: 50 mg       Quantity: 10 tablet    Refills: 0     No discharge procedures on file      ED Provider  Electronically Signed by           Cecilia Santacruz PA-C  09/06/19 7933

## 2019-09-07 NOTE — DISCHARGE INSTRUCTIONS
Warm soaks to right arm  Take antibiotics as directed  Take motrin 600mg every 6hours as needed for pain, ultram for severe pain  Follow up with family doctor in 2-3 days for recheck, sooner if symptoms worsen

## 2019-09-08 ENCOUNTER — HOSPITAL ENCOUNTER (EMERGENCY)
Facility: HOSPITAL | Age: 54
Discharge: HOME | End: 2019-09-08
Attending: EMERGENCY MEDICINE
Payer: COMMERCIAL

## 2019-09-08 ENCOUNTER — OFFICE VISIT (OUTPATIENT)
Dept: URGENT CARE | Facility: CLINIC | Age: 54
End: 2019-09-08
Payer: COMMERCIAL

## 2019-09-08 VITALS
TEMPERATURE: 97.3 F | SYSTOLIC BLOOD PRESSURE: 158 MMHG | OXYGEN SATURATION: 98 % | HEIGHT: 62 IN | RESPIRATION RATE: 19 BRPM | BODY MASS INDEX: 26.68 KG/M2 | WEIGHT: 145 LBS | HEART RATE: 115 BPM | DIASTOLIC BLOOD PRESSURE: 73 MMHG

## 2019-09-08 VITALS
SYSTOLIC BLOOD PRESSURE: 156 MMHG | RESPIRATION RATE: 16 BRPM | WEIGHT: 128 LBS | DIASTOLIC BLOOD PRESSURE: 80 MMHG | OXYGEN SATURATION: 97 % | HEIGHT: 63 IN | BODY MASS INDEX: 22.68 KG/M2 | HEART RATE: 93 BPM | TEMPERATURE: 99 F

## 2019-09-08 DIAGNOSIS — L02.413 ABSCESS OF RIGHT FOREARM: Primary | ICD-10-CM

## 2019-09-08 DIAGNOSIS — L03.113 CELLULITIS OF RIGHT ARM: Primary | ICD-10-CM

## 2019-09-08 PROCEDURE — S9088 SERVICES PROVIDED IN URGENT: HCPCS | Performed by: PHYSICIAN ASSISTANT

## 2019-09-08 PROCEDURE — 87147 CULTURE TYPE IMMUNOLOGIC: CPT | Performed by: EMERGENCY MEDICINE

## 2019-09-08 PROCEDURE — 10061 I&D ABSCESS COMP/MULTIPLE: CPT | Performed by: EMERGENCY MEDICINE

## 2019-09-08 PROCEDURE — 99213 OFFICE O/P EST LOW 20 MIN: CPT | Performed by: PHYSICIAN ASSISTANT

## 2019-09-08 PROCEDURE — 87070 CULTURE OTHR SPECIMN AEROBIC: CPT | Performed by: EMERGENCY MEDICINE

## 2019-09-08 PROCEDURE — 87205 SMEAR GRAM STAIN: CPT | Performed by: EMERGENCY MEDICINE

## 2019-09-08 PROCEDURE — 99283 EMERGENCY DEPT VISIT LOW MDM: CPT

## 2019-09-08 PROCEDURE — 87186 SC STD MICRODIL/AGAR DIL: CPT | Performed by: EMERGENCY MEDICINE

## 2019-09-08 PROCEDURE — 99284 EMERGENCY DEPT VISIT MOD MDM: CPT | Performed by: EMERGENCY MEDICINE

## 2019-09-08 RX ORDER — LIDOCAINE HYDROCHLORIDE 10 MG/ML
5 INJECTION, SOLUTION EPIDURAL; INFILTRATION; INTRACAUDAL; PERINEURAL ONCE
Status: COMPLETED | OUTPATIENT
Start: 2019-09-08 | End: 2019-09-08

## 2019-09-08 RX ADMIN — LIDOCAINE HYDROCHLORIDE 5 ML: 10 INJECTION, SOLUTION EPIDURAL; INFILTRATION; INTRACAUDAL; PERINEURAL at 16:57

## 2019-09-08 NOTE — PROGRESS NOTES
NAME: Gallito Rader is a 48 y o  male  : 1965    MRN: [id number]      Assessment and Plan   Cellulitis of right arm [L03 113]  1  Cellulitis of right arm  Transfer to other facility     IV abx on Friday - patient taking keflex and bactrim as directed with worsening pain, spreading redness and fevers at home 101  99 in clinic  Will refer back to ER for further evaluation  Patient Instructions   Patient Instructions   Patient sent to ER for further evaluation and treatment  Proceed to ER if symptoms worsen  Chief Complaint     Chief Complaint   Patient presents with    Insect Bite     C/o bite on right arm   tender, red, swollen  First noticed 3/4 days ago  History of Present Illness   Patient presents complaining of worsening spider bite to his right arm x3 days  States he was in the ER on Friday for a red spot to his right forearm  Reports that he was given IV antibiotics for it and had a full workup including blood work  He states "it wasn't in my blood so they sent me home " He reports he was given bactrim and keflex and states he has been taking it as directed  He reports despite the abx the area of redness on his arm is starting to get bigger and is becoming more painful, despite taking tramadol as well  He reports he also has had a fever at home as high as 101 and chills  Review of Systems   Review of Systems   Constitutional: Positive for chills and fever  Skin: Positive for wound           Current Medications       Current Outpatient Medications:     carBAMazepine (TEGretol XR) 100 mg 12 hr tablet, Take 1 tablet (100 mg total) by mouth 2 (two) times a day for 7 days, Disp: 14 tablet, Rfl: 0    cephalexin (KEFLEX) 500 mg capsule, Take 1 capsule (500 mg total) by mouth 4 (four) times a day for 7 days, Disp: 28 capsule, Rfl: 0    losartan (COZAAR) 50 mg tablet, Take 1 tablet (50 mg total) by mouth daily for 7 days, Disp: 7 tablet, Rfl: 0    PHENobarbital (LUMINAL) 97 2 MG tablet, Take 1 tablet (97 2 mg total) by mouth 2 (two) times a day for 7 days, Disp: 14 tablet, Rfl: 0    predniSONE 20 mg tablet, Take 3 tablets (60 mg total) by mouth daily, Disp: 12 tablet, Rfl: 0    sulfamethoxazole-trimethoprim (BACTRIM DS) 800-160 mg per tablet, Take 1 tablet by mouth 2 (two) times a day for 10 days smx-tmp DS (BACTRIM) 800-160 mg tabs (1tab q12 D10), Disp: 20 tablet, Rfl: 0    traMADol (ULTRAM) 50 mg tablet, Take 1 tablet (50 mg total) by mouth every 6 (six) hours as needed for moderate pain for up to 10 days, Disp: 20 tablet, Rfl: 0    traMADol (ULTRAM) 50 mg tablet, Take 1 tablet (50 mg total) by mouth every 6 (six) hours as needed for moderate pain, Disp: 10 tablet, Rfl: 0    Current Allergies     Allergies as of 09/08/2019 - Reviewed 09/08/2019   Allergen Reaction Noted    Aspirin  01/31/2017              Past Medical History:   Diagnosis Date    Depression     Hypertension     [facility] [address] [facility]     TBI [facility]traumatic brain injury) ([facility] [address] ) 1989       Past Surgical History:   Procedure Laterality Date    KNEE ARTHROSCOPY Right        No family history on file  Medications have been verified  The following portions of the patient's history were reviewed and updated as appropriate: allergies, current medications, past family history, past medical history, past social history, past surgical history and problem list     Objective   /80   Pulse 93   Temp 99 °F (37 2 °C)   Resp 16   Ht 5' 3" (1 6 m)   Wt 58 1 kg (128 lb)   SpO2 97%   BMI 22 67 kg/m²      Physical Exam     Physical Exam   Constitutional: He appears well-developed and well-nourished  No distress  Skin: He is not diaphoretic  Large area of erythema to the volar aspect of right forearm with 1 cm area of induration in the center  Entire area is TTP and extends up to the elbow  Warm to touch, indurated  No scabbing or eschar present  Vitals reviewed

## 2019-09-08 NOTE — ED PROVIDER NOTES
History  Chief Complaint   Patient presents with    Cellulitis     patient states he was seen in the er two days ago and was told he has cellulitis  was put on abx and states it is not getting better and thinks that it is from a spider bite  states having a fever of 101 this morning  Patient complains of right forearm pain swelling redness getting worse over last 4 days  Was here and received IV abx, oral abx and urgent care referred over here for eval due to worsening symptoms, fever today to 101 that resolved on its own  Patient thinks that a small brown spider may have bitten him there but he is not sure  His daughter had recent abscess that had to be drained as well  Prior to Admission Medications   Prescriptions Last Dose Informant Patient Reported? Taking?    PHENobarbital (LUMINAL) 97 2 MG tablet   No No   Sig: Take 1 tablet (97 2 mg total) by mouth 2 (two) times a day for 7 days   carBAMazepine (TEGretol XR) 100 mg 12 hr tablet   No No   Sig: Take 1 tablet (100 mg total) by mouth 2 (two) times a day for 7 days   cephalexin (KEFLEX) 500 mg capsule   No No   Sig: Take 1 capsule (500 mg total) by mouth 4 (four) times a day for 7 days   losartan (COZAAR) 50 mg tablet   No No   Sig: Take 1 tablet (50 mg total) by mouth daily for 7 days   predniSONE 20 mg tablet   No No   Sig: Take 3 tablets (60 mg total) by mouth daily   sulfamethoxazole-trimethoprim (BACTRIM DS) 800-160 mg per tablet   No No   Sig: Take 1 tablet by mouth 2 (two) times a day for 10 days smx-tmp DS (BACTRIM) 800-160 mg tabs (1tab q12 D10)   traMADol (ULTRAM) 50 mg tablet   No No   Sig: Take 1 tablet (50 mg total) by mouth every 6 (six) hours as needed for moderate pain for up to 10 days   traMADol (ULTRAM) 50 mg tablet   No No   Sig: Take 1 tablet (50 mg total) by mouth every 6 (six) hours as needed for moderate pain      Facility-Administered Medications: None       Past Medical History:   Diagnosis Date    Depression     Hypertension     [facility] [address] [facility]     [facility]       Past Surgical History:   Procedure Laterality Date    KNEE ARTHROSCOPY Right        History reviewed  No pertinent family history  I have reviewed and agree with the history as documented  Social History     Tobacco Use    Smoking status: Current Some Day Smoker     Packs/day: 0 25     Types: Cigarettes    Smokeless tobacco: Current User     Types: Chew   Substance Use Topics    Alcohol use: Never     Frequency: Never    Drug use: Never        Review of Systems   All other systems reviewed and are negative  Physical Exam  Physical Exam   Constitutional: He appears well-developed and well-nourished  HENT:   Mouth/Throat: Oropharynx is clear and moist    Eyes: Pupils are equal, round, and reactive to light  Conjunctivae and EOM are normal    Neck: Normal range of motion  Neck supple  No spinous process tenderness present  Cardiovascular: Normal rate, regular rhythm, normal heart sounds and intact distal pulses  Pulmonary/Chest: Effort normal and breath sounds normal  No respiratory distress  He has no wheezes  Abdominal: Soft  Bowel sounds are normal  He exhibits no distension  There is no tenderness  Musculoskeletal: Normal range of motion  Right forearm: He exhibits tenderness and swelling  Arms:  Neurological: He is alert  He has normal strength  No sensory deficit  GCS eye subscore is 4  GCS verbal subscore is 5  GCS motor subscore is 6  Skin: Skin is warm and dry  No rash noted  Psychiatric: He has a normal mood and affect  Nursing note and vitals reviewed        Vital Signs  ED Triage Vitals [09/08/19 1602]   Temperature Pulse Respirations Blood Pressure SpO2   (!) 97 3 °F (36 3 °C) (!) 115 19 158/73 98 %      Temp Source Heart Rate Source Patient Position - Orthostatic VS BP Location FiO2 (%)   Tympanic Monitor Sitting Left arm --      Pain Score       7           Vitals:    09/08/19 1602   BP: 158/73   Pulse: (!) 115   Patient Position - Orthostatic VS: Sitting         Visual Acuity      ED Medications  Medications   lidocaine (PF) (XYLOCAINE-MPF) 1 % injection 5 mL (5 mL Infiltration Given 9/8/19 1874)       Diagnostic Studies  Results Reviewed     Procedure Component Value Units Date/Time    Wound culture and Gram stain [[id number]] Collected:  09/08/19 7982    Lab Status: In process Specimen:  Wound from Arm, Right Updated:  09/08/19 7065                 No orders to display              Procedures  Incision and drain  Date/Time: 9/8/2019 4:17 PM  Performed by: Honey Boland DO  Authorized by: Honey Boland DO     Patient location:  ED  Consent:     Consent obtained:  Verbal    Consent given by:  Patient    Risks discussed:  Pain, incomplete drainage, bleeding and infection    Alternatives discussed:  No treatment  Universal protocol:     Procedure explained and questions answered to patient or proxy's satisfaction: yes      Patient identity confirmed:  Verbally with patient  Location:     Type:  Abscess    Size:  2    Location:  Upper extremity    Upper extremity location:  R arm  Pre-procedure details:     Skin preparation:  Betadine  Anesthesia (see MAR for exact dosages): Anesthesia method:  Local infiltration    Local anesthetic:  Lidocaine 1% w/o epi  Procedure details:     Complexity:  Simple    Incision types:  Stab incision    Scalpel blade:  10    Approach:  Open    Incision depth:  Subcutaneous    Wound management:  Probed and deloculated, irrigated with saline and extensive cleaning    Drainage:  Purulent    Drainage amount: Moderate    Wound treatment:  Packing placed    Packing materials:  1/2 in gauze    Amount 1/2":  1 inch  Post-procedure details:     Patient tolerance of procedure:   Tolerated well, no immediate complications           ED Course                               MDM  Number of Diagnoses or Management Options  Abscess of right forearm: new and requires workup     Amount and/or Complexity of Data Reviewed  Clinical lab tests: ordered and reviewed  Obtain history from someone other than the patient: yes    Patient Progress  Patient progress: improved      Disposition  Final diagnoses:   Abscess of right forearm - with cellulitis     Time reflects when diagnosis was documented in both MDM as applicable and the Disposition within this note     Time User Action Codes Description Comment    9/8/2019  5:31 PM Jose Serrano Add [L02 413] Abscess of right forearm     9/8/2019  5:31 PM Jose Serrano Modify [F18 173] Abscess of right forearm with cellulitis      ED Disposition     ED Disposition Condition Date/Time Comment    Discharge Stable [place] Sep 8, 2019  5:31 PM Gallito Rader discharge to home/self care              Follow-up Information     Follow up With Specialties Details Why Contact Info Additional Information    [address] Now [facility] In 2 days  [address] [address]  [address], [address], [address], [facility], [address] [facility] [address], [facility]          Discharge Medication List as of 9/8/2019  5:32 PM      CONTINUE these medications which have NOT CHANGED    Details   carBAMazepine (TEGretol XR) 100 mg 12 hr tablet Take 1 tablet (100 mg total) by mouth 2 (two) times a day for 7 days, Starting Sun 8/11/2019, Until Sun 9/1/2019, Print      cephalexin (KEFLEX) 500 mg capsule Take 1 capsule (500 mg total) by mouth 4 (four) times a day for 7 days, Starting Fri 9/6/2019, Until Fri 9/13/2019, Normal      losartan (COZAAR) 50 mg tablet Take 1 tablet (50 mg total) by mouth daily for 7 days, Starting Sun 8/11/2019, Until Sun 9/1/2019, Print      PHENobarbital (LUMINAL) 97 2 MG tablet Take 1 tablet (97 2 mg total) by mouth 2 (two) times a day for 7 days, Starting Sun 8/11/2019, Until Sun 9/1/2019, Print      predniSONE 20 mg tablet Take 3 tablets (60 mg total) by mouth daily, Starting Mon 9/2/2019, Normal sulfamethoxazole-trimethoprim (BACTRIM DS) 800-160 mg per tablet Take 1 tablet by mouth 2 (two) times a day for 10 days smx-tmp DS (BACTRIM) 800-160 mg tabs (1tab q12 D10), Starting Fri 9/6/2019, Until Mon 9/16/2019, Normal      !! traMADol (ULTRAM) 50 mg tablet Take 1 tablet (50 mg total) by mouth every 6 (six) hours as needed for moderate pain for up to 10 days, Starting Sun 9/1/2019, Until Wed 9/11/2019, Normal      !! traMADol (ULTRAM) 50 mg tablet Take 1 tablet (50 mg total) by mouth every 6 (six) hours as needed for moderate pain, Starting Fri 9/6/2019, Normal       !! - Potential duplicate medications found  Please discuss with provider  No discharge procedures on file      ED Provider  Electronically Signed by           Niraj Lobato DO  09/08/19 2018

## 2019-09-08 NOTE — ED NOTES
Patient does not think that he needs to put a hospital gown on        Rfaael Dominguez, RN  09/08/19 4947

## 2019-09-09 ENCOUNTER — HOSPITAL ENCOUNTER (EMERGENCY)
Facility: HOSPITAL | Age: 54
Discharge: HOME | End: 2019-09-09
Attending: EMERGENCY MEDICINE | Admitting: EMERGENCY MEDICINE
Payer: COMMERCIAL

## 2019-09-09 VITALS
BODY MASS INDEX: 26.68 KG/M2 | SYSTOLIC BLOOD PRESSURE: 162 MMHG | TEMPERATURE: 97.9 F | DIASTOLIC BLOOD PRESSURE: 88 MMHG | WEIGHT: 145 LBS | HEART RATE: 103 BPM | HEIGHT: 62 IN | RESPIRATION RATE: 20 BRPM | OXYGEN SATURATION: 97 %

## 2019-09-09 DIAGNOSIS — Z20.2 STD EXPOSURE: Primary | ICD-10-CM

## 2019-09-09 PROCEDURE — 99283 EMERGENCY DEPT VISIT LOW MDM: CPT

## 2019-09-09 PROCEDURE — 87491 CHLMYD TRACH DNA AMP PROBE: CPT | Performed by: PHYSICIAN ASSISTANT

## 2019-09-09 PROCEDURE — 99284 EMERGENCY DEPT VISIT MOD MDM: CPT | Performed by: PHYSICIAN ASSISTANT

## 2019-09-09 PROCEDURE — 87591 N.GONORRHOEAE DNA AMP PROB: CPT | Performed by: PHYSICIAN ASSISTANT

## 2019-09-09 RX ORDER — AZITHROMYCIN 250 MG/1
1000 TABLET, FILM COATED ORAL ONCE
Status: COMPLETED | OUTPATIENT
Start: 2019-09-09 | End: 2019-09-09

## 2019-09-09 RX ADMIN — AZITHROMYCIN 1000 MG: 250 TABLET, FILM COATED ORAL at 20:30

## 2019-09-10 ENCOUNTER — HOSPITAL ENCOUNTER (EMERGENCY)
Facility: HOSPITAL | Age: 54
Discharge: HOME | End: 2019-09-10
Attending: EMERGENCY MEDICINE | Admitting: EMERGENCY MEDICINE
Payer: COMMERCIAL

## 2019-09-10 ENCOUNTER — OFFICE VISIT (OUTPATIENT)
Dept: URGENT CARE | Facility: CLINIC | Age: 54
End: 2019-09-10
Payer: COMMERCIAL

## 2019-09-10 ENCOUNTER — APPOINTMENT (EMERGENCY)
Dept: RADIOLOGY | Facility: HOSPITAL | Age: 54
End: 2019-09-10
Payer: COMMERCIAL

## 2019-09-10 VITALS — SYSTOLIC BLOOD PRESSURE: 119 MMHG | DIASTOLIC BLOOD PRESSURE: 78 MMHG | TEMPERATURE: 97.2 F | HEART RATE: 95 BPM

## 2019-09-10 VITALS
HEART RATE: 78 BPM | BODY MASS INDEX: 26.69 KG/M2 | SYSTOLIC BLOOD PRESSURE: 115 MMHG | WEIGHT: 145.06 LBS | HEIGHT: 62 IN | RESPIRATION RATE: 18 BRPM | DIASTOLIC BLOOD PRESSURE: 71 MMHG | TEMPERATURE: 98.7 F | OXYGEN SATURATION: 96 %

## 2019-09-10 DIAGNOSIS — R07.89 OTHER CHEST PAIN: Primary | ICD-10-CM

## 2019-09-10 DIAGNOSIS — R07.89 ATYPICAL CHEST PAIN: Primary | ICD-10-CM

## 2019-09-10 DIAGNOSIS — L02.413 ABSCESS OF RIGHT FOREARM: ICD-10-CM

## 2019-09-10 DIAGNOSIS — L02.413 ABSCESS OF ARM, RIGHT: ICD-10-CM

## 2019-09-10 LAB
ALBUMIN SERPL BCP-MCNC: 4 G/DL (ref 3.5–5)
ALP SERPL-CCNC: 107 U/L (ref 46–116)
ALT SERPL W P-5'-P-CCNC: 26 U/L (ref 12–78)
ANION GAP SERPL CALCULATED.3IONS-SCNC: 7 MMOL/L (ref 4–13)
AST SERPL W P-5'-P-CCNC: 26 U/L (ref 5–45)
BACTERIA WND AEROBE CULT: ABNORMAL
BASOPHILS # BLD AUTO: 0.03 THOUSANDS/ΜL (ref 0–0.1)
BASOPHILS NFR BLD AUTO: 0 % (ref 0–1)
BILIRUB SERPL-MCNC: 0.2 MG/DL (ref 0.2–1)
BUN SERPL-MCNC: 8 MG/DL (ref 5–25)
C TRACH DNA SPEC QL NAA+PROBE: POSITIVE
CALCIUM SERPL-MCNC: 9.2 MG/DL (ref 8.3–10.1)
CHLORIDE SERPL-SCNC: 102 MMOL/L (ref 100–108)
CO2 SERPL-SCNC: 32 MMOL/L (ref 21–32)
CREAT SERPL-MCNC: 1.16 MG/DL (ref 0.6–1.3)
EOSINOPHIL # BLD AUTO: 0.15 THOUSAND/ΜL (ref 0–0.61)
EOSINOPHIL NFR BLD AUTO: 2 % (ref 0–6)
ERYTHROCYTE [DISTWIDTH] IN BLOOD BY AUTOMATED COUNT: 13.2 % (ref 11.6–15.1)
GFR SERPL CREATININE-BSD FRML MDRD: 71 ML/MIN/1.73SQ M
GLUCOSE SERPL-MCNC: 108 MG/DL (ref 65–140)
GRAM STN SPEC: ABNORMAL
HCT VFR BLD AUTO: 41.8 % (ref 36.5–49.3)
HGB BLD-MCNC: 14.3 G/DL (ref 12–17)
IMM GRANULOCYTES # BLD AUTO: 0.03 THOUSAND/UL (ref 0–0.2)
IMM GRANULOCYTES NFR BLD AUTO: 0 % (ref 0–2)
LYMPHOCYTES # BLD AUTO: 2.28 THOUSANDS/ΜL (ref 0.6–4.47)
LYMPHOCYTES NFR BLD AUTO: 34 % (ref 14–44)
MCH RBC QN AUTO: 34.9 PG (ref 26.8–34.3)
MCHC RBC AUTO-ENTMCNC: 34.2 G/DL (ref 31.4–37.4)
MCV RBC AUTO: 102 FL (ref 82–98)
MONOCYTES # BLD AUTO: 0.72 THOUSAND/ΜL (ref 0.17–1.22)
MONOCYTES NFR BLD AUTO: 11 % (ref 4–12)
N GONORRHOEA DNA SPEC QL NAA+PROBE: NEGATIVE
NEUTROPHILS # BLD AUTO: 3.5 THOUSANDS/ΜL (ref 1.85–7.62)
NEUTS SEG NFR BLD AUTO: 53 % (ref 43–75)
NRBC BLD AUTO-RTO: 0 /100 WBCS
PLATELET # BLD AUTO: 267 THOUSANDS/UL (ref 149–390)
PMV BLD AUTO: 9.8 FL (ref 8.9–12.7)
POTASSIUM SERPL-SCNC: 4 MMOL/L (ref 3.5–5.3)
PROT SERPL-MCNC: 7.7 G/DL (ref 6.4–8.2)
RBC # BLD AUTO: 4.1 MILLION/UL (ref 3.88–5.62)
SODIUM SERPL-SCNC: 141 MMOL/L (ref 136–145)
TROPONIN I SERPL-MCNC: <0.02 NG/ML
WBC # BLD AUTO: 6.71 THOUSAND/UL (ref 4.31–10.16)

## 2019-09-10 PROCEDURE — 99285 EMERGENCY DEPT VISIT HI MDM: CPT

## 2019-09-10 PROCEDURE — 99284 EMERGENCY DEPT VISIT MOD MDM: CPT | Performed by: PHYSICIAN ASSISTANT

## 2019-09-10 PROCEDURE — 93005 ELECTROCARDIOGRAM TRACING: CPT | Performed by: FAMILY MEDICINE

## 2019-09-10 PROCEDURE — 36415 COLL VENOUS BLD VENIPUNCTURE: CPT | Performed by: PHYSICIAN ASSISTANT

## 2019-09-10 PROCEDURE — 80053 COMPREHEN METABOLIC PANEL: CPT | Performed by: PHYSICIAN ASSISTANT

## 2019-09-10 PROCEDURE — 93005 ELECTROCARDIOGRAM TRACING: CPT

## 2019-09-10 PROCEDURE — 71045 X-RAY EXAM CHEST 1 VIEW: CPT

## 2019-09-10 PROCEDURE — 85025 COMPLETE CBC W/AUTO DIFF WBC: CPT | Performed by: PHYSICIAN ASSISTANT

## 2019-09-10 PROCEDURE — 84484 ASSAY OF TROPONIN QUANT: CPT | Performed by: PHYSICIAN ASSISTANT

## 2019-09-10 PROCEDURE — 96374 THER/PROPH/DIAG INJ IV PUSH: CPT

## 2019-09-10 RX ORDER — LIDOCAINE HYDROCHLORIDE AND EPINEPHRINE 10; 10 MG/ML; UG/ML
10 INJECTION, SOLUTION INFILTRATION; PERINEURAL ONCE
Status: COMPLETED | OUTPATIENT
Start: 2019-09-10 | End: 2019-09-10

## 2019-09-10 RX ORDER — HYDROCODONE BITARTRATE AND ACETAMINOPHEN 5; 325 MG/1; MG/1
1 TABLET ORAL EVERY 6 HOURS PRN
Qty: 6 TABLET | Refills: 0 | Status: SHIPPED | OUTPATIENT
Start: 2019-09-10 | End: 2019-09-16 | Stop reason: ALTCHOICE

## 2019-09-10 RX ORDER — KETOROLAC TROMETHAMINE 30 MG/ML
15 INJECTION, SOLUTION INTRAMUSCULAR; INTRAVENOUS ONCE
Status: COMPLETED | OUTPATIENT
Start: 2019-09-10 | End: 2019-09-10

## 2019-09-10 RX ADMIN — LIDOCAINE HYDROCHLORIDE,EPINEPHRINE BITARTRATE 10 ML: 10; .01 INJECTION, SOLUTION INFILTRATION; PERINEURAL at 20:00

## 2019-09-10 RX ADMIN — KETOROLAC TROMETHAMINE 15 MG: 30 INJECTION, SOLUTION INTRAMUSCULAR at 20:00

## 2019-09-10 NOTE — ED PROVIDER NOTES
History  Chief Complaint   Patient presents with    Chest Pain     patient presents to the ED with c/o chest pain for the past four days  patient also states he went to urgent care to have his wound on his arm evaluated but they could not do it there so he would like that looked at as well     Arm Pain     49 yo male w/ hx of seizure disorder and HTN presents to the Emergency Department for evaluation of chest pain x 4 days  Pain has been constant 7/10, non radiating, without modifying factors  Has strong FmHx of CAD/MI  Current smoker  No associated fever, chills, sweats, SOB, vomiting or back pain  No hx of cardiac workups/stress test    Also concerned about lesion to R arm  Had I&D earlier this week, culture grew out MRSA, has been on cephalexin and TMP-SMX since  Symptoms improving  Prior to Admission Medications   Prescriptions Last Dose Informant Patient Reported? Taking?    PHENobarbital (LUMINAL) 97 2 MG tablet   No No   Sig: Take 1 tablet (97 2 mg total) by mouth 2 (two) times a day for 7 days   carBAMazepine (TEGretol XR) 100 mg 12 hr tablet   No No   Sig: Take 1 tablet (100 mg total) by mouth 2 (two) times a day for 7 days   cephalexin (KEFLEX) 500 mg capsule   No No   Sig: Take 1 capsule (500 mg total) by mouth 4 (four) times a day for 7 days   losartan (COZAAR) 50 mg tablet   No No   Sig: Take 1 tablet (50 mg total) by mouth daily for 7 days   predniSONE 20 mg tablet   No No   Sig: Take 3 tablets (60 mg total) by mouth daily   sulfamethoxazole-trimethoprim (BACTRIM DS) 800-160 mg per tablet   No No   Sig: Take 1 tablet by mouth 2 (two) times a day for 10 days smx-tmp DS (BACTRIM) 800-160 mg tabs (1tab q12 D10)   traMADol (ULTRAM) 50 mg tablet   No No   Sig: Take 1 tablet (50 mg total) by mouth every 6 (six) hours as needed for moderate pain for up to 10 days   traMADol (ULTRAM) 50 mg tablet   No No   Sig: Take 1 tablet (50 mg total) by mouth every 6 (six) hours as needed for moderate pain Facility-Administered Medications: None       Past Medical History:   Diagnosis Date    Depression     Hypertension     [facility] [address]     TBI [facility]traumatic [facility] injury) ([facility] ) 1989       Past Surgical History:   Procedure Laterality Date    KNEE ARTHROSCOPY Right        History reviewed  No pertinent family history  I have reviewed and agree with the history as documented  Social History     Tobacco Use    Smoking status: Current Some Day Smoker     Packs/day: 0 25     Types: Cigarettes    Smokeless tobacco: Current User     Types: Chew   Substance Use Topics    Alcohol use: Never     Frequency: Never    Drug use: Never        Review of Systems   Constitutional: Negative for chills, diaphoresis and fever  Eyes: Negative for visual disturbance  Respiratory: Negative for cough and shortness of breath  Cardiovascular: Positive for chest pain  Negative for palpitations  Gastrointestinal: Negative for abdominal pain, diarrhea, nausea and vomiting  Genitourinary: Negative for dysuria, flank pain and frequency  Musculoskeletal: Negative for arthralgias and myalgias  Skin: Negative for color change, rash and wound  Allergic/Immunologic: Negative for immunocompromised state  Neurological: Negative for dizziness and light-headedness  Hematological: Does not bruise/bleed easily  Psychiatric/Behavioral: Negative for confusion  The patient is not nervous/anxious  Physical Exam  Physical Exam   Constitutional: He is oriented to person, place, and time  He appears well-developed and well-nourished  No distress  HENT:   Head: Normocephalic and atraumatic  Eyes: Pupils are equal, round, and reactive to light  No scleral icterus  Neck: No JVD present  Cardiovascular: Normal rate and regular rhythm  Exam reveals no gallop and no friction rub  No murmur heard  Pulmonary/Chest: No respiratory distress  He has no wheezes  He has no rales     Musculoskeletal:        Right lower leg: He exhibits no edema  Left lower leg: He exhibits no edema  Normal ROM of the R wrist and digits   Neurological: He is alert and oriented to person, place, and time  Skin: Skin is warm and dry  He is not diaphoretic    3cm x 2cm firm abscess to R forearm, open incisional wound without purulent discharge  Wound probed, no sinus tracts, no deloculation required  No further drainage  Vitals reviewed        Vital Signs  ED Triage Vitals [09/10/19 1937]   Temperature Pulse Respirations Blood Pressure SpO2   98 7 °F (37 1 °C) 102 20 168/86 100 %      Temp Source Heart Rate Source Patient Position - Orthostatic VS BP Location FiO2 (%)   Temporal Monitor Sitting Left arm --      Pain Score       Worst Possible Pain           Vitals:    09/10/19 2015 09/10/19 2030 09/10/19 2045 09/10/19 2100   BP: 128/74 132/72 137/70 115/71   Pulse: 94 93 82 78   Patient Position - Orthostatic VS:  Lying           Visual Acuity      ED Medications  Medications   ketorolac (TORADOL) injection 15 mg (15 mg Intravenous Given 9/10/19 2000)   lidocaine-epinephrine (XYLOCAINE/EPINEPHRINE) 1 %-1:100,000 injection 10 mL (10 mL Infiltration Given 9/10/19 2000)       Diagnostic Studies  Results Reviewed     Procedure Component Value Units Date/Time    Troponin I [[id number]]  (Normal) Collected:  09/10/19 1955    Lab Status:  Final result Specimen:  Blood from Arm, Right Updated:  09/10/19 2024     Troponin I <0 02 ng/mL     Comprehensive metabolic panel [[id number]] Collected:  09/10/19 1955    Lab Status:  Final result Specimen:  Blood from Arm, Right Updated:  09/10/19 2021     Sodium 141 mmol/L      Potassium 4 0 mmol/L      Chloride 102 mmol/L      CO2 32 mmol/L      ANION GAP 7 mmol/L      BUN 8 mg/dL      Creatinine 1 16 mg/dL      Glucose 108 mg/dL      Calcium 9 2 mg/dL      AST 26 U/L      ALT 26 U/L      Alkaline Phosphatase 107 U/L      Total Protein 7 7 g/dL      Albumin 4 0 g/dL      Total Bilirubin 0 20 mg/dL      eGFR 71 ml/min/1 73sq m     Narrative:       National Kidney Disease Foundation guidelines for Chronic Kidney Disease (CKD):     Stage 1 with normal or high GFR (GFR > 90 mL/min/1 73 square meters)    Stage 2 Mild CKD (GFR = 60-89 mL/min/1 73 square meters)    Stage 3A Moderate CKD (GFR = 45-59 mL/min/1 73 square meters)    Stage 3B Moderate CKD (GFR = 30-44 mL/min/1 73 square meters)    Stage 4 Severe CKD (GFR = 15-29 mL/min/1 73 square meters)    Stage 5 End Stage CKD (GFR <15 mL/min/1 73 square meters)  Note: GFR calculation is accurate only with a steady state creatinine    CBC and differential [[id number]]  (Abnormal) Collected:  09/10/19 1955    Lab Status:  Final result Specimen:  Blood from Arm, Right Updated:  09/10/19 2004     WBC 6 71 Thousand/uL      RBC 4 10 Million/uL      Hemoglobin 14 3 g/dL      Hematocrit 41 8 %       fL      MCH 34 9 pg      MCHC 34 2 g/dL      RDW 13 2 %      MPV 9 8 fL      Platelets 811 Thousands/uL      nRBC 0 /100 WBCs      Neutrophils Relative 53 %      Immat GRANS % 0 %      Lymphocytes Relative 34 %      Monocytes Relative 11 %      Eosinophils Relative 2 %      Basophils Relative 0 %      Neutrophils Absolute 3 50 Thousands/µL      Immature Grans Absolute 0 03 Thousand/uL      Lymphocytes Absolute 2 28 Thousands/µL      Monocytes Absolute 0 72 Thousand/µL      Eosinophils Absolute 0 15 Thousand/µL      Basophils Absolute 0 03 Thousands/µL                  XR chest 1 view portable    (Results Pending)              Procedures  ECG 12 Lead Documentation Only  Date/Time: 9/10/2019 7:30 PM  Performed by: Tonya Cornelius PA-C  Authorized by: Tonya Cornelius PA-C     Indications / Diagnosis:  Chest pain  ECG reviewed by me, the ED Provider: yes    Patient location:  ED  Previous ECG:     Previous ECG:  Unavailable  Interpretation:     Interpretation: non-specific    Rate:     ECG rate:  99    ECG rate assessment: normal    Rhythm:     Rhythm: sinus tachycardia    Ectopy: Ectopy: none    QRS:     QRS axis:  Normal    QRS intervals:  Normal  Conduction:     Conduction: normal    ST segments:     ST segments:  Normal  T waves:     T waves: normal             ED Course         HEART Risk Score      Most Recent Value   History  1 Filed at: 09/10/2019 2028   ECG  0 Filed at: 09/10/2019 2028   Age  1 Filed at: 09/10/2019 2028   Risk Factors  2 Filed at: 09/10/2019 2028   Troponin  0 Filed at: 09/10/2019 2028   Heart Score Risk Calculator   History  1 Filed at: 09/10/2019 2028   ECG  0 Filed at: 09/10/2019 2028   Age  1 Filed at: 09/10/2019 2028   Risk Factors  2 Filed at: 09/10/2019 2028   Troponin  0 Filed at: 09/10/2019 2028   HEART Score  4 Filed at: 09/10/2019 2028   HEART Score  4 Filed at: 09/10/2019 2028                            MDM  Number of Diagnoses or Management Options  Atypical chest pain: new and requires workup  Diagnosis management comments: 49 yo male presents w/ atypical chest pain x 4 days  Troponin negative, EKG unremarkable  Will refer for outpatient stress testing; HEART score of 4 however given lack of objective findings after 4 days, do not feel inpatient admission is warranted  Abscess probed, no evidence of reaccumulation of purulence  Improving          Amount and/or Complexity of Data Reviewed  Clinical lab tests: ordered and reviewed  Tests in the radiology section of CPT®: ordered and reviewed  Tests in the medicine section of CPT®: ordered and reviewed  Review and summarize past medical records: yes  Independent visualization of images, tracings, or specimens: yes        Disposition  Final diagnoses:   Atypical chest pain   Abscess of right forearm     Time reflects when diagnosis was documented in both MDM as applicable and the Disposition within this note     Time User Action Codes Description Comment    9/10/2019  8:57 PM Giana Sharif Add [R07 89] Atypical chest pain     9/10/2019  9:23 PM Giana Sharif Add [L02 413] Abscess of right forearm       ED Disposition     ED Disposition Condition Date/Time Comment    Discharge Stable Tue Sep 10, 2019  8:57 PM Daiana Reilly discharge to home/self care              Follow-up Information     Follow up With Specialties Details Why Contact Info Additional [facility] Schedule an appointment as soon as possible for a visit   [address] [phone]  [address] [facility] [phone], [address] [place], [place], [address]          Discharge Medication List as of 9/10/2019  9:02 PM      START taking these medications    Details   HYDROcodone-acetaminophen (NORCO) 5-325 mg per tablet Take 1 tablet by mouth every 6 (six) hours as needed for pain for up to 10 daysMax Daily Amount: 4 tablets, Starting Tue 9/10/2019, Until Fri 9/20/2019, Print         CONTINUE these medications which have NOT CHANGED    Details   carBAMazepine (TEGretol XR) 100 mg 12 hr tablet Take 1 tablet (100 mg total) by mouth 2 (two) times a day for 7 days, Starting Sun 8/11/2019, Until Sun 9/1/2019, Print      cephalexin (KEFLEX) 500 mg capsule Take 1 capsule (500 mg total) by mouth 4 (four) times a day for 7 days, Starting Fri 9/6/2019, Until Fri 9/13/2019, Normal      losartan (COZAAR) 50 mg tablet Take 1 tablet (50 mg total) by mouth daily for 7 days, Starting Sun 8/11/2019, Until Sun 9/1/2019, Print      PHENobarbital (LUMINAL) 97 2 MG tablet Take 1 tablet (97 2 mg total) by mouth 2 (two) times a day for 7 days, Starting Sun 8/11/2019, Until Sun 9/1/2019, Print      predniSONE 20 mg tablet Take 3 tablets (60 mg total) by mouth daily, Starting Mon 9/2/2019, Normal      sulfamethoxazole-trimethoprim (BACTRIM DS) 800-160 mg per tablet Take 1 tablet by mouth 2 (two) times a day for 10 days smx-tmp DS (BACTRIM) 800-160 mg tabs (1tab q12 D10), Starting Fri 9/6/2019, Until Mon 9/16/2019, Normal      !! traMADol (ULTRAM) 50 mg tablet Take 1 tablet (50 mg total) by mouth every 6 (six) hours as needed for moderate pain for up to 10 days, Starting Sun 9/1/2019, Until Wed 9/11/2019, Normal      !! traMADol (ULTRAM) 50 mg tablet Take 1 tablet (50 mg total) by mouth every 6 (six) hours as needed for moderate pain, Starting Fri 9/6/2019, Normal       !! - Potential duplicate medications found  Please discuss with provider  Outpatient Discharge Orders   Stress test only, exercise   Standing Status: Future Standing Exp   Date: 09/10/23       ED Provider  Electronically Signed by           Dinah Gongora PA-C  09/10/19 0965

## 2019-09-10 NOTE — ED PROVIDER NOTES
History  Chief Complaint   Patient presents with    Exposure to STD     patient presents to ED after wife tested positive for chlamidia and would like to be tested himself  31-year-old male presents for evaluation after recent discovery of chlamydia exposure  States that his wife was diagnosed with Chlamydia today  He has no symptoms  No other complaints          Prior to Admission Medications   Prescriptions Last Dose Informant Patient Reported? Taking? PHENobarbital (LUMINAL) 97 2 MG tablet   No No   Sig: Take 1 tablet (97 2 mg total) by mouth 2 (two) times a day for 7 days   carBAMazepine (TEGretol XR) 100 mg 12 hr tablet   No No   Sig: Take 1 tablet (100 mg total) by mouth 2 (two) times a day for 7 days   cephalexin (KEFLEX) 500 mg capsule   No No   Sig: Take 1 capsule (500 mg total) by mouth 4 (four) times a day for 7 days   losartan (COZAAR) 50 mg tablet   No No   Sig: Take 1 tablet (50 mg total) by mouth daily for 7 days   predniSONE 20 mg tablet   No No   Sig: Take 3 tablets (60 mg total) by mouth daily   sulfamethoxazole-trimethoprim (BACTRIM DS) 800-160 mg per tablet   No No   Sig: Take 1 tablet by mouth 2 (two) times a day for 10 days smx-tmp DS (BACTRIM) 800-160 mg tabs (1tab q12 D10)   traMADol (ULTRAM) 50 mg tablet   No No   Sig: Take 1 tablet (50 mg total) by mouth every 6 (six) hours as needed for moderate pain for up to 10 days   traMADol (ULTRAM) 50 mg tablet   No No   Sig: Take 1 tablet (50 mg total) by mouth every 6 (six) hours as needed for moderate pain      Facility-Administered Medications: None       Past Medical History:   Diagnosis Date    Depression     Hypertension     Seizures (HCC)     [facility] [address] [facility] [address]       Past Surgical History:   Procedure Laterality Date    KNEE ARTHROSCOPY Right        History reviewed  No pertinent family history  I have reviewed and agree with the history as documented      Social History     Tobacco Use    Smoking status: Current Some Day Smoker     Packs/day: 0 25     Types: Cigarettes    Smokeless tobacco: Current User     Types: Chew   Substance Use Topics    Alcohol use: Never     Frequency: Never    Drug use: Never        Review of Systems   Constitutional: Negative for chills, diaphoresis and fever  Gastrointestinal: Negative for abdominal pain, diarrhea, nausea and vomiting  Genitourinary: Negative for dysuria, flank pain, frequency, hematuria and urgency  Musculoskeletal: Negative for arthralgias, back pain and myalgias  Skin: Negative for color change and rash  Neurological: Negative for dizziness and light-headedness  Physical Exam  Physical Exam   Constitutional: He is oriented to person, place, and time  He appears well-developed and well-nourished  No distress  HENT:   Head: Normocephalic and atraumatic  Eyes: Pupils are equal, round, and reactive to light  No scleral icterus  Neck: No JVD present  Cardiovascular: Normal rate and regular rhythm  Exam reveals no gallop and no friction rub  No murmur heard  Pulmonary/Chest: No respiratory distress  He has no wheezes  He has no rales  Neurological: He is alert and oriented to person, place, and time  Skin: Skin is warm and dry  Capillary refill takes less than 2 seconds  He is not diaphoretic  Vitals reviewed        Vital Signs  ED Triage Vitals [09/09/19 2031]   Temperature Pulse Respirations Blood Pressure SpO2   97 9 °F (36 6 °C) 103 20 162/88 97 %      Temp Source Heart Rate Source Patient Position - Orthostatic VS BP Location FiO2 (%)   Temporal Monitor Standing Right arm --      Pain Score       No Pain           Vitals:    09/09/19 2031   BP: 162/88   Pulse: 103   Patient Position - Orthostatic VS: Standing         Visual Acuity      ED Medications  Medications   azithromycin (ZITHROMAX) tablet 1,000 mg (1,000 mg Oral Given 9/9/19 2030)       Diagnostic Studies  Results Reviewed     Procedure Component Value Units Date/Time 8 [facility] amplified DNA by PCR [[id number]] Collected:  09/09/19 2031    Lab Status: In process Specimen:  Urine, Other Updated:  09/09/19 2047                 No orders to display              Procedures  Procedures       ED Course                               MDM  Number of Diagnoses or Management Options  STD exposure: new and requires workup  Diagnosis management comments: GC Chlamydia sent, treated with 1 g azithromycin in the emergency department  Amount and/or Complexity of Data Reviewed  Review and summarize past medical records: yes        Disposition  Final diagnoses:   STD exposure     Time reflects when diagnosis was documented in both MDM as applicable and the Disposition within this note     Time User Action Codes Description Comment    9/9/2019  8:33 PM Harsh Ann Add [Z20 2] STD exposure       ED Disposition     ED Disposition Condition Date/Time Comment    Discharge Stable Mon Sep 9, 2019  8:33 PM Renaldo Jang discharge to home/self care              Follow-up Information    None         Discharge Medication List as of 9/9/2019  8:33 PM      CONTINUE these medications which have NOT CHANGED    Details   carBAMazepine (TEGretol XR) 100 mg 12 hr tablet Take 1 tablet (100 mg total) by mouth 2 (two) times a day for 7 days, Starting Sun 8/11/2019, Until Sun 9/1/2019, Print      cephalexin (KEFLEX) 500 mg capsule Take 1 capsule (500 mg total) by mouth 4 (four) times a day for 7 days, Starting Fri 9/6/2019, Until Fri 9/13/2019, Normal      losartan (COZAAR) 50 mg tablet Take 1 tablet (50 mg total) by mouth daily for 7 days, Starting Sun 8/11/2019, Until Sun 9/1/2019, Print      PHENobarbital (LUMINAL) 97 2 MG tablet Take 1 tablet (97 2 mg total) by mouth 2 (two) times a day for 7 days, Starting Sun 8/11/2019, Until Sun 9/1/2019, Print      predniSONE 20 mg tablet Take 3 tablets (60 mg total) by mouth daily, Starting Mon 9/2/2019, Normal      sulfamethoxazole-trimethoprim (BACTRIM DS) 800-160 mg per tablet Take 1 tablet by mouth 2 (two) times a day for 10 days smx-tmp DS (BACTRIM) 800-160 mg tabs (1tab q12 D10), Starting Fri 9/6/2019, Until Mon 9/16/2019, Normal      !! traMADol (ULTRAM) 50 mg tablet Take 1 tablet (50 mg total) by mouth every 6 (six) hours as needed for moderate pain for up to 10 days, Starting Sun 9/1/2019, Until Wed 9/11/2019, Normal      !! traMADol (ULTRAM) 50 mg tablet Take 1 tablet (50 mg total) by mouth every 6 (six) hours as needed for moderate pain, Starting Fri 9/6/2019, Normal       !! - Potential duplicate medications found  Please discuss with provider  No discharge procedures on file      ED Provider  Electronically Signed by           Tonya Cornelius PA-C  09/09/19 3809

## 2019-09-10 NOTE — PATIENT INSTRUCTIONS
F/u here as needed  Recommend transfer to ER for chest pain,   Possible infected tenosynovitis of L forearm  ECG-  NSR, no ST changes or T wave changes  Patients wife will drive him now to the ER

## 2019-09-10 NOTE — PROGRESS NOTES
NAME: Kristy Parsons is a 48 y o  male  : 1965    MRN: [id number]      Assessment and Plan   Other chest pain [R07 89]  1  Other chest pain  ECG 12 lead    Transfer to other facility   2  Abscess of arm, right  Transfer to other facility           Patient Instructions   Patient Instructions   F/u here as needed  Recommend transfer to ER for chest pain,   Possible infected tenosynovitis of L forearm  ECG-  NSR, no ST changes or T wave changes  Patients wife will drive him now to the ER  Proceed to ER if symptoms worsen  Chief Complaint     Chief Complaint   Patient presents with    Wound Check     pt states he is here for repacking  I&D on   O2 is 98% RA         History of Present Illness   Here c/o R forearm abscess  He is taking Bactrim and Keflex  No issues with meds   I&D at ER on   He is not doing well  No fevers for the last 2 days  Took motrin and tylenol early this morning  Also c/o chest pain for 2 days  Some mild SOB currently  L sided, no radiation  No GERD symptoms  No hx of CAD  Mom/sister hx of CAD  Denies dyslipidemia or DM  Hx of HTN>  Constant chest pain  Getting worse  Not positional  Denies calf pain of hx of DVT      Review of Systems   Review of Systems   Constitutional: Negative for fatigue and fever  HENT: Negative for ear pain and trouble swallowing  Eyes: Negative for visual disturbance  Respiratory: Positive for chest tightness and shortness of breath  Cardiovascular: Positive for chest pain  Gastrointestinal: Negative for abdominal pain, diarrhea, nausea and vomiting  Genitourinary: Negative for difficulty urinating and dysuria  Skin: Positive for wound  Negative for rash  Neurological: Negative for weakness and headaches  Psychiatric/Behavioral: Negative for behavioral problems and confusion           Current Medications       Current Outpatient Medications:     cephalexin (KEFLEX) 500 mg capsule, Take 1 capsule (500 mg total) by mouth 4 (four) times a day for 7 days, Disp: 28 capsule, Rfl: 0    predniSONE 20 mg tablet, Take 3 tablets (60 mg total) by mouth daily, Disp: 12 tablet, Rfl: 0    sulfamethoxazole-trimethoprim (BACTRIM DS) 800-160 mg per tablet, Take 1 tablet by mouth 2 (two) times a day for 10 days smx-tmp DS (BACTRIM) 800-160 mg tabs (1tab q12 D10), Disp: 20 tablet, Rfl: 0    traMADol (ULTRAM) 50 mg tablet, Take 1 tablet (50 mg total) by mouth every 6 (six) hours as needed for moderate pain for up to 10 days, Disp: 20 tablet, Rfl: 0    carBAMazepine (TEGretol XR) 100 mg 12 hr tablet, Take 1 tablet (100 mg total) by mouth 2 (two) times a day for 7 days, Disp: 14 tablet, Rfl: 0    losartan (COZAAR) 50 mg tablet, Take 1 tablet (50 mg total) by mouth daily for 7 days, Disp: 7 tablet, Rfl: 0    PHENobarbital (LUMINAL) 97 2 MG tablet, Take 1 tablet (97 2 mg total) by mouth 2 (two) times a day for 7 days, Disp: 14 tablet, Rfl: 0    traMADol (ULTRAM) 50 mg tablet, Take 1 tablet (50 mg total) by mouth every 6 (six) hours as needed for moderate pain, Disp: 10 tablet, Rfl: 0  No current facility-administered medications for this visit  Current Allergies     Allergies as of 09/10/2019 - Reviewed 09/10/2019   Allergen Reaction Noted    Aspirin  01/31/2017              Past Medical History:   Diagnosis Date    Depression     Hypertension     [facility] [address] [facility]     TBI (traumatic brain injury) ([facility] [address] ) 1989       Past Surgical History:   Procedure Laterality Date    KNEE ARTHROSCOPY Right        History reviewed  No pertinent family history  Medications have been verified      The following portions of the patient's history were reviewed and updated as appropriate: allergies, current medications, past family history, past medical history, past social history, past surgical history and problem list     Objective   /78 (BP Location: Left arm, Patient Position: Sitting, Cuff Size: Standard)   Pulse 95   Temp (!) 97 2 °F (36 2 °C) (Tympanic)      Physical Exam     Physical Exam   Constitutional: He is oriented to person, place, and time  He appears well-developed and well-nourished  HENT:   Head: Normocephalic  Eyes: EOM are normal    Neck: Normal range of motion  Cardiovascular: Normal rate, regular rhythm and normal heart sounds  Exam reveals no gallop and no friction rub  No murmur heard  Pulmonary/Chest: Effort normal and breath sounds normal  No respiratory distress  He has no wheezes  He has no rales  Abdominal: Soft  Bowel sounds are normal  He exhibits no distension  There is no tenderness  There is no rebound and no guarding  Musculoskeletal: Normal range of motion  Neurological: He is oriented to person, place, and time  Skin: Skin is warm and dry  No rash noted  R dorsal forearm- 3 cm indurated TTPabscess  No fluid expressed  Pain with extension of L hand and movement of fingers  No redness  Psychiatric: He has a normal mood and affect  Thought content normal    Nursing note and vitals reviewed

## 2019-09-11 LAB
ATRIAL RATE: 93 BPM
ATRIAL RATE: 93 BPM
ATRIAL RATE: 94 BPM
ATRIAL RATE: 99 BPM
P AXIS: 74 DEGREES
P AXIS: 77 DEGREES
P AXIS: 79 DEGREES
P AXIS: 82 DEGREES
PR INTERVAL: 152 MS
PR INTERVAL: 162 MS
PR INTERVAL: 164 MS
PR INTERVAL: 166 MS
QRS AXIS: 65 DEGREES
QRS AXIS: 67 DEGREES
QRS AXIS: 70 DEGREES
QRS AXIS: 73 DEGREES
QRSD INTERVAL: 66 MS
QRSD INTERVAL: 76 MS
QRSD INTERVAL: 76 MS
QRSD INTERVAL: 84 MS
QT INTERVAL: 326 MS
QT INTERVAL: 334 MS
QT INTERVAL: 336 MS
QT INTERVAL: 338 MS
QTC INTERVAL: 417 MS
QTC INTERVAL: 417 MS
QTC INTERVAL: 418 MS
QTC INTERVAL: 420 MS
T WAVE AXIS: 65 DEGREES
T WAVE AXIS: 71 DEGREES
T WAVE AXIS: 72 DEGREES
T WAVE AXIS: 74 DEGREES
VENTRICULAR RATE: 93 BPM
VENTRICULAR RATE: 93 BPM
VENTRICULAR RATE: 94 BPM
VENTRICULAR RATE: 99 BPM

## 2019-09-11 PROCEDURE — 93010 ELECTROCARDIOGRAM REPORT: CPT | Performed by: INTERNAL MEDICINE

## 2019-09-11 NOTE — RESULT ENCOUNTER NOTE
Called patient about results  No answer, LMOM x 1  Patient was treated in the ER, but will need to be notified

## 2019-09-12 LAB
BACTERIA BLD CULT: NORMAL
BACTERIA BLD CULT: NORMAL

## 2019-09-12 NOTE — RESULT ENCOUNTER NOTE
Called patient and informed him of +Chlamydia  He was treated in the ER  He was instructed to notify his partners  He states his wife already knows and she was treated

## 2019-09-16 ENCOUNTER — OFFICE VISIT (OUTPATIENT)
Dept: FAMILY MEDICINE CLINIC | Facility: HOSPITAL | Age: 54
End: 2019-09-16
Payer: COMMERCIAL

## 2019-09-16 ENCOUNTER — TELEPHONE (OUTPATIENT)
Dept: FAMILY MEDICINE CLINIC | Facility: HOSPITAL | Age: 54
End: 2019-09-16

## 2019-09-16 VITALS
HEIGHT: 63 IN | BODY MASS INDEX: 22.75 KG/M2 | HEART RATE: 76 BPM | OXYGEN SATURATION: 96 % | WEIGHT: 128.4 LBS | DIASTOLIC BLOOD PRESSURE: 76 MMHG | SYSTOLIC BLOOD PRESSURE: 142 MMHG

## 2019-09-16 DIAGNOSIS — L02.413 ABSCESS OF ARM, RIGHT: Primary | ICD-10-CM

## 2019-09-16 DIAGNOSIS — Z87.898 HISTORY OF SEIZURE: ICD-10-CM

## 2019-09-16 DIAGNOSIS — G40.919 BREAKTHROUGH SEIZURE (HCC): ICD-10-CM

## 2019-09-16 DIAGNOSIS — M25.461 PAIN AND SWELLING OF RIGHT KNEE: ICD-10-CM

## 2019-09-16 DIAGNOSIS — G89.29 CHRONIC PAIN OF RIGHT KNEE: ICD-10-CM

## 2019-09-16 DIAGNOSIS — M25.561 PAIN AND SWELLING OF RIGHT KNEE: ICD-10-CM

## 2019-09-16 DIAGNOSIS — M25.561 CHRONIC PAIN OF RIGHT KNEE: ICD-10-CM

## 2019-09-16 PROCEDURE — 99203 OFFICE O/P NEW LOW 30 MIN: CPT | Performed by: PHYSICIAN ASSISTANT

## 2019-09-16 RX ORDER — LOSARTAN POTASSIUM 50 MG/1
50 TABLET ORAL DAILY
Qty: 30 TABLET | Refills: 3 | Status: SHIPPED | OUTPATIENT
Start: 2019-09-16 | End: 2022-01-29

## 2019-09-16 RX ORDER — ALBUTEROL SULFATE 90 UG/1
AEROSOL, METERED RESPIRATORY (INHALATION)
Refills: 3 | COMMUNITY
Start: 2019-08-19

## 2019-09-16 RX ORDER — CARBAMAZEPINE 100 MG/1
100 TABLET, EXTENDED RELEASE ORAL 2 TIMES DAILY
Qty: 60 TABLET | Refills: 3 | Status: SHIPPED | OUTPATIENT
Start: 2019-09-16 | End: 2020-01-10

## 2019-09-16 RX ORDER — CARBAMAZEPINE 100 MG/1
CAPSULE, EXTENDED RELEASE ORAL
Refills: 0 | COMMUNITY
Start: 2019-08-19 | End: 2019-09-16 | Stop reason: ALTCHOICE

## 2019-09-16 RX ORDER — PHENOBARBITAL 97.2 MG/1
97.2 TABLET ORAL 2 TIMES DAILY
Qty: 60 TABLET | Refills: 3 | Status: SHIPPED | OUTPATIENT
Start: 2019-09-16 | End: 2020-01-13

## 2019-09-16 RX ORDER — PREDNISONE 10 MG/1
TABLET ORAL
Refills: 0 | COMMUNITY
Start: 2019-09-01 | End: 2019-10-26

## 2019-09-16 NOTE — PATIENT INSTRUCTIONS
Area of abscess cleaned, then Mupirocin ointment placed on opening then bandaged  Referred to surgery
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
## 2019-09-18 ENCOUNTER — HOSPITAL ENCOUNTER (EMERGENCY)
Facility: HOSPITAL | Age: 54
Discharge: HOME | End: 2019-09-18
Attending: EMERGENCY MEDICINE | Admitting: EMERGENCY MEDICINE
Payer: COMMERCIAL

## 2019-09-18 VITALS
OXYGEN SATURATION: 95 % | HEIGHT: 62 IN | HEART RATE: 68 BPM | DIASTOLIC BLOOD PRESSURE: 64 MMHG | WEIGHT: 128.31 LBS | TEMPERATURE: 98.4 F | RESPIRATION RATE: 20 BRPM | SYSTOLIC BLOOD PRESSURE: 135 MMHG | BODY MASS INDEX: 23.61 KG/M2

## 2019-09-18 DIAGNOSIS — M54.32 SCIATICA OF LEFT SIDE: Primary | ICD-10-CM

## 2019-09-18 PROCEDURE — 96372 THER/PROPH/DIAG INJ SC/IM: CPT

## 2019-09-18 PROCEDURE — 99285 EMERGENCY DEPT VISIT HI MDM: CPT | Performed by: EMERGENCY MEDICINE

## 2019-09-18 PROCEDURE — 99283 EMERGENCY DEPT VISIT LOW MDM: CPT

## 2019-09-18 RX ORDER — PREDNISONE 20 MG/1
60 TABLET ORAL ONCE
Status: DISCONTINUED | OUTPATIENT
Start: 2019-09-18 | End: 2019-09-19 | Stop reason: HOSPADM

## 2019-09-18 RX ORDER — LIDOCAINE 50 MG/G
1 PATCH TOPICAL ONCE
Status: DISCONTINUED | OUTPATIENT
Start: 2019-09-18 | End: 2019-09-19 | Stop reason: HOSPADM

## 2019-09-18 RX ORDER — PREDNISONE 20 MG/1
20 TABLET ORAL 2 TIMES DAILY
Qty: 10 TABLET | Refills: 0 | Status: SHIPPED | OUTPATIENT
Start: 2019-09-19 | End: 2019-09-24

## 2019-09-18 RX ORDER — TRAMADOL HYDROCHLORIDE 50 MG/1
50 TABLET ORAL ONCE
Status: DISCONTINUED | OUTPATIENT
Start: 2019-09-18 | End: 2019-09-19 | Stop reason: HOSPADM

## 2019-09-18 RX ORDER — TRAMADOL HYDROCHLORIDE 50 MG/1
50 TABLET ORAL EVERY 6 HOURS PRN
Qty: 20 TABLET | Refills: 0 | Status: SHIPPED | OUTPATIENT
Start: 2019-09-18 | End: 2019-09-28

## 2019-09-18 RX ORDER — MORPHINE SULFATE 10 MG/ML
6 INJECTION, SOLUTION INTRAMUSCULAR; INTRAVENOUS ONCE
Status: COMPLETED | OUTPATIENT
Start: 2019-09-18 | End: 2019-09-18

## 2019-09-18 RX ADMIN — MORPHINE SULFATE 6 MG: 10 INJECTION INTRAVENOUS at 22:17

## 2019-09-19 ENCOUNTER — TELEPHONE (OUTPATIENT)
Dept: PHYSICAL THERAPY | Facility: OTHER | Age: 54
End: 2019-09-19

## 2019-09-19 NOTE — ED NOTES
Went to give patient medications, states "I dont want his fucking meds  Maybe he should x ray my back since my pain is worse  What am I paying him for, to just talk? This is bull shit  Tell him to come back in here"  Doctror notified        Waylan Ahumada, RN  09/18/19 7902

## 2019-09-19 NOTE — ED NOTES
dolores given d/c paperwork, states "im going to wait here for a minute"        Cory Zhang RN  09/18/19 2081

## 2019-09-19 NOTE — ED PROVIDER NOTES
History  Chief Complaint   Patient presents with    Back Pain     patient presents to the ED with c/o back pain for the last day, states he has two herniated discs but his pain has worsened today       This is a 48year old male who presents with low back pain down left leg  He was seen here 3 weeks ago after a fall  He had cat scan which showed diffuse djd and disc buldges  No incontinence  He did get some relief with tramadol and prednisone      History provided by:  Patient  Back Pain   Location:  Lumbar spine  Quality:  Shooting  Radiates to:  L foot  Pain severity:  Severe  Onset quality:  Sudden  Duration:  3 weeks  Timing:  Constant  Progression:  Waxing and waning  Chronicity:  Recurrent  Relieved by:  Nothing  Worsened by:  Palpation, touching and ambulation  Associated symptoms: numbness    Associated symptoms: no abdominal pain and no fever        Prior to Admission Medications   Prescriptions Last Dose Informant Patient Reported? Taking?    PHENobarbital (LUMINAL) 97 2 MG tablet   No No   Sig: Take 1 tablet (97 2 mg total) by mouth 2 (two) times a day   albuterol (PROVENTIL HFA,VENTOLIN HFA) 90 mcg/act inhaler   Yes No   Sig: USE TWO PUFFS EVERY 4-6 HOURS AS NEEDED   carBAMazepine (TEGretol XR) 100 mg 12 hr tablet   No No   Sig: Take 1 tablet (100 mg total) by mouth 2 (two) times a day   losartan (COZAAR) 50 mg tablet   No No   Sig: Take 1 tablet (50 mg total) by mouth daily   predniSONE 10 mg tablet   Yes No   Sig: TAKE 6 TABLETS (60MG TOTAL) BY MOUTH DAILY   traMADol (ULTRAM) 50 mg tablet   No No   Sig: Take 1 tablet (50 mg total) by mouth every 6 (six) hours as needed for moderate pain   Patient not taking: Reported on 9/16/2019      Facility-Administered Medications: None       Past Medical History:   Diagnosis Date    Depression     Hypertension     Seizures (HCC)     TBI [facility]traumatic brain injury) ([facility] [address] ) 1989       Past Surgical History:   Procedure Laterality Date    KNEE ARTHROSCOPY Right Family History   Problem Relation Age of Onset    Heart disease Mother     Diabetes Mother     Hypertension Father     Heart disease Sister     Cancer Sister      I have reviewed and agree with the history as documented  Social History     Tobacco Use    Smoking status: Current Some Day Smoker     Packs/day: 0 25     Types: Cigarettes    Smokeless tobacco: Current User     Types: Chew   Substance Use Topics    Alcohol use: Never     Frequency: Never    Drug use: Never        Review of Systems   Constitutional: Negative for fever  Gastrointestinal: Negative for abdominal pain  Musculoskeletal: Positive for back pain  Neurological: Positive for numbness  All other systems reviewed and are negative  Physical Exam  Physical Exam   Constitutional: He is oriented to person, place, and time  He appears well-developed and well-nourished  He appears distressed  HENT:   Head: Normocephalic and atraumatic  Right Ear: External ear normal    Left Ear: External ear normal    Nose: Nose normal    Mouth/Throat: Oropharynx is clear and moist    Eyes: Pupils are equal, round, and reactive to light  EOM are normal  Right eye exhibits no discharge  Left eye exhibits no discharge  No scleral icterus  Neck: Neck supple  No JVD present  No tracheal deviation present  Cardiovascular: Normal rate, regular rhythm and intact distal pulses  Exam reveals no gallop and no friction rub  No murmur heard  Pulmonary/Chest: Effort normal and breath sounds normal  No stridor  No respiratory distress  He has no wheezes  Abdominal: Soft  Bowel sounds are normal  He exhibits no distension  There is no tenderness  Musculoskeletal: He exhibits tenderness  He exhibits no edema or deformity  Left lower lumbar tenderness  Gross sensation to touch is intact  Decreased movement of left leg secondary to pain but no motor defecit  Neurological: He is alert and oriented to person, place, and time   He displays normal reflexes  No cranial nerve deficit or sensory deficit  He exhibits normal muscle tone  Coordination normal    Skin: Skin is warm and dry  He is not diaphoretic  Psychiatric: He has a normal mood and affect  His behavior is normal  Thought content normal    Nursing note and vitals reviewed  Vital Signs  ED Triage Vitals [09/18/19 2129]   Temperature Pulse Respirations Blood Pressure SpO2   98 4 °F (36 9 °C) 68 20 135/64 95 %      Temp Source Heart Rate Source Patient Position - Orthostatic VS BP Location FiO2 (%)   Temporal Monitor Sitting Right arm --      Pain Score       Worst Possible Pain           Vitals:    09/18/19 2129   BP: 135/64   Pulse: 68   Patient Position - Orthostatic VS: Sitting         Visual Acuity      ED Medications  Medications   lidocaine (LIDODERM) 5 % patch 1 patch (0 patches Topical Hold 9/18/19 2206)   traMADol (ULTRAM) tablet 50 mg (50 mg Oral Not Given 9/18/19 2206)   predniSONE tablet 60 mg (60 mg Oral Not Given 9/18/19 2206)   morphine (PF) 10 mg/mL injection 6 mg (6 mg Intramuscular Given 9/18/19 2217)       Diagnostic Studies  Results Reviewed     None                 No orders to display              Procedures  Procedures       ED Course  ED Course as of Sep 18 2340   Wed Sep 18, 2019   2206 Patient refusing to take pills insisting on repeat x-rays I told him that CT scan was done 3 weeks ago did not show anything other than bulging discs no acute bony pathology needs outpatient MRI  He is insisting on some stronger pain medication at this time  MDM  Number of Diagnoses or Management Options  Diagnosis management comments: Left sided sciatica, will treat symptomatically and follow-up with comprehensive spine         Disposition  Final diagnoses:   Sciatica of left side     Time reflects when diagnosis was documented in both MDM as applicable and the Disposition within this note     Time User Action Codes Description Comment 9/18/2019  9:58 PM Ever [place] Add [M54 32] Sciatica of left side       ED Disposition     ED Disposition Condition Date/Time Comment    Discharge Stable Wed Sep 18, 2019  9:58 PM Maira Cummings discharge to home/self care  Follow-up Information     Follow up With Specialties Details Why Contact Info    Óscar Rojas PA-C Internal Medicine, Physician Assistant In 1 week follow-up [facility]  [address] [phone]      [place] [id number] Program Physical Therapy In 1 week further evaluation and treatment [phone]          Discharge Medication List as of 9/18/2019 10:02 PM      START taking these medications    Details   ! ! predniSONE 20 mg tablet Take 1 tablet (20 mg total) by mouth 2 (two) times a day for 5 days, Starting Thu 9/19/2019, Until Tue 9/24/2019, Print      !! traMADol (ULTRAM) 50 mg tablet Take 1 tablet (50 mg total) by mouth every 6 (six) hours as needed for moderate pain for up to 10 days, Starting Wed 9/18/2019, Until Sat 9/28/2019, Print       !! - Potential duplicate medications found  Please discuss with provider        CONTINUE these medications which have NOT CHANGED    Details   albuterol (PROVENTIL HFA,VENTOLIN HFA) 90 mcg/act inhaler USE TWO PUFFS EVERY 4-6 HOURS AS NEEDED, Historical Med      carBAMazepine (TEGretol XR) 100 mg 12 hr tablet Take 1 tablet (100 mg total) by mouth 2 (two) times a day, Starting Mon 9/16/2019, Until Wed 10/16/2019, Normal      losartan (COZAAR) 50 mg tablet Take 1 tablet (50 mg total) by mouth daily, Starting Mon 9/16/2019, Until Wed 10/16/2019, Normal      PHENobarbital (LUMINAL) 97 2 MG tablet Take 1 tablet (97 2 mg total) by mouth 2 (two) times a day, Starting Mon 9/16/2019, Until Wed 10/16/2019, Normal      !! predniSONE 10 mg tablet TAKE 6 TABLETS (60MG TOTAL) BY MOUTH DAILY, Historical Med      !! traMADol (ULTRAM) 50 mg tablet Take 1 tablet (50 mg total) by mouth every 6 (six) hours as needed for moderate pain, Starting Fri 9/6/2019, Normal       !! - Potential duplicate medications found  Please discuss with provider              ED Provider  Electronically Signed by           Juliana Samayoa,   09/18/19 [place],   09/18/19 8519

## 2019-09-23 ENCOUNTER — VBI (OUTPATIENT)
Dept: ADMINISTRATIVE | Facility: OTHER | Age: 54
End: 2019-09-23

## 2019-09-23 NOTE — TELEPHONE ENCOUNTER
Ronit Pollockch    ED Visit Information     Ed visit date: 9/18/2019  Diagnosis Description: Sciatica of left side  In Network? Yes [address]  Discharge status: Home  Discharged with meds ? Yes  Number of ED visits to date: 8  ED Severity:8     Outreach Information    Outreach successful: Yes 2  Date letter mailed:n/a  Date Finalized:9/24/2019    Care Coordination    Follow up appointment with pcp: yes Pending  Transportation issues ? No    [employer] type:  7 Day Outreach  Emergent necessity warranted by diagnosis:  No  [facility] PCP:  Yes  Transportation:  Friend/Family Transport  Called PCP first?:  No  Feels able to call PCP for urgent problems ?:  Yes  Understands what emergencies can be handled by PCP ?:  Yes  Ever any problems getting appointment with PCP for minor emergency/urgency problems?:  No  Practice Contacted Patient ?:  No  Pt had ED follow up with pcp/staff ?:  No    Seen for follow-up out of network ?:  No  Reason Patient went to ED instead of Urgent Care or PCP?:  [facility]  09/23/2019 04:20 PM Phone (Syeda Delgado) Sari Lora (Self) [phone] (H)   Left Message  Unable to reach patient regarding recent ED visit on 9/18 for Sciatica of left side  Patient was discharged with medication (tramamdol, prednisone) and advised to follow up with PCP  2nd attempt will be made on 9/24 09/24/2019 09:01 AM Phone (Syeda Delgado) Sari Lora (Self) [phone] (H)   Call Complete  Personal communication with patient regarding recent ED visit on 9/18 for Sciatica of left side  Patient Patient was discharged with medication (tramamdol, prednisone) and advised to follow up with PCP  Patient stated that his back pain has improved and was able to return to work but he is still experiencing some pain  Patient requested that I message office regarding a follow up appt  Patient was having transportation issues but he was able to get vehicle fixed  Patient does not meet OPCM criteria   It was slightly difficult to keep patient focused on follow up questions  Patient was not aware of PCP after hours on-call service, his nearest [place] [address] urgent care facility or what could be treated there, education given

## 2019-10-06 ENCOUNTER — OFFICE VISIT (OUTPATIENT)
Dept: URGENT CARE | Facility: CLINIC | Age: 54
End: 2019-10-06
Payer: COMMERCIAL

## 2019-10-06 ENCOUNTER — APPOINTMENT (OUTPATIENT)
Dept: RADIOLOGY | Facility: CLINIC | Age: 54
End: 2019-10-06
Payer: COMMERCIAL

## 2019-10-06 VITALS
HEIGHT: 62 IN | RESPIRATION RATE: 16 BRPM | WEIGHT: 128 LBS | HEART RATE: 82 BPM | OXYGEN SATURATION: 97 % | TEMPERATURE: 97.7 F | BODY MASS INDEX: 23.55 KG/M2 | SYSTOLIC BLOOD PRESSURE: 132 MMHG | DIASTOLIC BLOOD PRESSURE: 78 MMHG

## 2019-10-06 DIAGNOSIS — S69.91XA FINGER INJURY, RIGHT, INITIAL ENCOUNTER: ICD-10-CM

## 2019-10-06 DIAGNOSIS — S69.91XA FINGER INJURY, RIGHT, INITIAL ENCOUNTER: Primary | ICD-10-CM

## 2019-10-06 PROCEDURE — 73140 X-RAY EXAM OF FINGER(S): CPT

## 2019-10-06 PROCEDURE — 99213 OFFICE O/P EST LOW 20 MIN: CPT | Performed by: PHYSICIAN ASSISTANT

## 2019-10-06 PROCEDURE — S9088 SERVICES PROVIDED IN URGENT: HCPCS | Performed by: PHYSICIAN ASSISTANT

## 2019-10-06 RX ORDER — PREDNISONE 10 MG/1
TABLET ORAL
Qty: 21 TABLET | Refills: 0 | Status: SHIPPED | OUTPATIENT
Start: 2019-10-06 | End: 2019-10-26

## 2019-10-06 NOTE — LETTER
October 6, 2019     Patient: Catarino Shultz   YOB: 1965   Date of Visit: 10/6/2019       To Whom it May Concern:    Catarino Shultz was seen in my clinic on 10/6/2019  He may return to work on 10/8/2019  If you have any questions or concerns, please don't hesitate to call           Sincerely,          DONTE Munoz        CC: No Recipients

## 2019-10-06 NOTE — PROGRESS NOTES
NAME: Dereck Rodríguez is a 48 y o  male  : 1965    MRN: [id number]      Assessment and Plan   Finger injury, right, initial encounter [S69 91XA]  1  Finger injury, right, initial encounter  XR finger right second digit-index    predniSONE 10 mg tablet    Ambulatory referral to Orthopedic Surgery      right index finger x-ray:  No acute fracture noted  Degenerative changes throughout    Discussed with patient how this may be related to arthritic picture  Will splint for comfort  Referral to Ortho put in and patient made appointment at the   Will represcribe prednisone in the meantime as patient reports no improvement with ibuprofen or Tylenol  Aluminum finger splint applied in clinic  Patient Instructions   Patient Instructions   Ice  Elevate  Prednisone as directed  Wear splint  Ortho appointment as scheduled     Proceed to ER if symptoms worsen  Chief Complaint     Chief Complaint   Patient presents with    Hand Problem     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  Unsure if he jammed finger  Unable to bend, feels tight and painful  History of Present Illness   Patient with no reported pmhx presents complaining of right index finger pain x 1 day  He states yesterday he started to have right finger pain, especially with bending but is unsure of any injury  He denies a specific injury or inciting event but states he works at a [employer] for a living and wonders if something happened there  He states he was unable to sleep last night due to the pain  Describes it as "throbbing" and reports it is painful at rest but worse with movement  Reports some tingling to the tip of the finger as well  Denies any history that he knows of with this finger  Reports he tried taking Tylenol and ibuprofen with no improvement  Denies history of arthritis or gout  Review of Systems   Review of Systems   Constitutional: Negative for chills and fever     Musculoskeletal: Right index finger pain         Current Medications       Current Outpatient Medications:     albuterol (PROVENTIL HFA,VENTOLIN HFA) 90 mcg/act inhaler, USE TWO PUFFS EVERY 4-6 HOURS AS NEEDED, Disp: , Rfl: 3    carBAMazepine (TEGretol XR) 100 mg 12 hr tablet, Take 1 tablet (100 mg total) by mouth 2 (two) times a day, Disp: 60 tablet, Rfl: 3    losartan (COZAAR) 50 mg tablet, Take 1 tablet (50 mg total) by mouth daily, Disp: 30 tablet, Rfl: 3    PHENobarbital (LUMINAL) 97 2 MG tablet, Take 1 tablet (97 2 mg total) by mouth 2 (two) times a day, Disp: 60 tablet, Rfl: 3    predniSONE 10 mg tablet, TAKE 6 TABLETS (60MG TOTAL) BY MOUTH DAILY, Disp: , Rfl: 0    predniSONE 10 mg tablet, Take 6 tablets today, 5 tablets tomorrow, 4 the next day, 3 the next day, 2 the following and 1 the last day- all with food, Disp: 21 tablet, Rfl: 0    traMADol (ULTRAM) 50 mg tablet, Take 1 tablet (50 mg total) by mouth every 6 (six) hours as needed for moderate pain (Patient not taking: Reported on 9/16/2019), Disp: 10 tablet, Rfl: 0    Current Allergies     Allergies as of 10/06/2019 - Reviewed 10/06/2019   Allergen Reaction Noted    Aspirin  01/31/2017              Past Medical History:   Diagnosis Date    Depression     Hypertension     Seizures (HCC)     TBI (traumatic brain injury) ([facility] [address] ) 1989       Past Surgical History:   Procedure Laterality Date    KNEE ARTHROSCOPY Right        Family History   Problem Relation Age of Onset    Heart disease Mother     Diabetes Mother     Hypertension Father     Heart disease Sister     Cancer Sister          Medications have been verified      The following portions of the patient's history were reviewed and updated as appropriate: allergies, current medications, past family history, past medical history, past social history, past surgical history and problem list     Objective   /78   Pulse 82   Temp 97 7 °F (36 5 °C)   Resp 16   Ht 5' 2" (1 575 m)   Wt 58 1 kg (128 lb)   SpO2 97%   BMI 23 41 kg/m²      Physical Exam     Physical Exam   Constitutional: He appears well-developed and well-nourished  No distress  Musculoskeletal:   Right index finger:  Slightly swollen throughout the entire finger but mostly at the PIP joint  No ecchymosis, abrasions or open wounds noted  Exquisitely tender to palpation at the IP joint and distal   Not tender at the MCP joint  Decreased flexion due to pain  Full extension with some pain  Sensation intact to the distal tip  Capillary refill less than 2 seconds  Skin: He is not diaphoretic  Vitals reviewed

## 2019-10-26 ENCOUNTER — HOSPITAL ENCOUNTER (EMERGENCY)
Facility: HOSPITAL | Age: 54
Discharge: HOME | End: 2019-10-26
Attending: EMERGENCY MEDICINE | Admitting: EMERGENCY MEDICINE
Payer: COMMERCIAL

## 2019-10-26 VITALS
HEART RATE: 82 BPM | SYSTOLIC BLOOD PRESSURE: 115 MMHG | DIASTOLIC BLOOD PRESSURE: 79 MMHG | TEMPERATURE: 97.8 F | OXYGEN SATURATION: 99 % | RESPIRATION RATE: 16 BRPM

## 2019-10-26 DIAGNOSIS — M79.641 PAIN IN BOTH HANDS: Primary | ICD-10-CM

## 2019-10-26 DIAGNOSIS — M79.642 PAIN IN BOTH HANDS: Primary | ICD-10-CM

## 2019-10-26 PROCEDURE — 96372 THER/PROPH/DIAG INJ SC/IM: CPT

## 2019-10-26 PROCEDURE — 99284 EMERGENCY DEPT VISIT MOD MDM: CPT | Performed by: EMERGENCY MEDICINE

## 2019-10-26 PROCEDURE — 99283 EMERGENCY DEPT VISIT LOW MDM: CPT

## 2019-10-26 RX ORDER — KETOROLAC TROMETHAMINE 30 MG/ML
60 INJECTION, SOLUTION INTRAMUSCULAR; INTRAVENOUS ONCE
Status: COMPLETED | OUTPATIENT
Start: 2019-10-26 | End: 2019-10-26

## 2019-10-26 RX ORDER — NAPROXEN 500 MG/1
500 TABLET ORAL 2 TIMES DAILY WITH MEALS
Qty: 20 TABLET | Refills: 0 | Status: SHIPPED | OUTPATIENT
Start: 2019-10-26

## 2019-10-26 RX ADMIN — KETOROLAC TROMETHAMINE 60 MG: 30 INJECTION, SOLUTION INTRAMUSCULAR at 01:05

## 2019-10-26 NOTE — ED PROVIDER NOTES
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  He has been told he has had arthritis to his hands in the past   He does have neck problems in lower back problems  Patient reports that at night he wakes up with pain to both hands  He also experiences tingling and numbness  He reports that the tingling and numbness is to the entirety of both hands  Not really to the ulna or thenar aspect only  He has no neurologic complaints in his face or in his legs  No headache  No speech or vision problems  When he is a woken with the discomfort is fairly severe  There are no relieving factors  There been no associated fevers  Prior to Admission Medications   Prescriptions Last Dose Informant Patient Reported? Taking?    PHENobarbital (LUMINAL) 97 2 MG tablet   No No   Sig: Take 1 tablet (97 2 mg total) by mouth 2 (two) times a day   albuterol (PROVENTIL HFA,VENTOLIN HFA) 90 mcg/act inhaler   Yes No   Sig: USE TWO PUFFS EVERY 4-6 HOURS AS NEEDED   carBAMazepine (TEGretol XR) 100 mg 12 hr tablet   No No   Sig: Take 1 tablet (100 mg total) by mouth 2 (two) times a day   losartan (COZAAR) 50 mg tablet   No No   Sig: Take 1 tablet (50 mg total) by mouth daily      Facility-Administered Medications: None       Past Medical History:   Diagnosis Date    Depression     Hypertension     Seizures (HCC)     TBI (traumatic brain injury) ([facility] [place] [address] ) 1989       Past Surgical History:   Procedure Laterality Date    KNEE ARTHROSCOPY Right        Family History   Problem Relation Age of Onset    Heart disease Mother  Diabetes Mother     Hypertension Father     Heart disease Sister     Cancer Sister      I have reviewed and agree with the history as documented  Social History     Tobacco Use    Smoking status: Current Some Day Smoker     Packs/day: 0 25     Types: Cigarettes    Smokeless tobacco: Current User     Types: Chew   Substance Use Topics    Alcohol use: Never     Frequency: Never    Drug use: Never        Review of Systems   Constitutional: Negative for chills and fever  Skin: Negative for pallor and wound  Neurological: Positive for numbness  Negative for weakness  All other systems reviewed and are negative  Physical Exam  Physical Exam   Constitutional: He is oriented to person, place, and time  He appears well-developed and well-nourished  No distress  HENT:   Head: Normocephalic and atraumatic  Eyes: Right eye exhibits no discharge  Left eye exhibits no discharge  No scleral icterus  Neck: Normal range of motion  Neck supple  There is no palpable midline cervical tenderness  Cardiovascular: Normal rate, regular rhythm and intact distal pulses  Pulmonary/Chest: Effort normal  No respiratory distress  Musculoskeletal: Normal range of motion  He exhibits no edema, tenderness or deformity  Neurological: He is alert and oriented to person, place, and time  He has normal strength  No sensory deficit  GCS eye subscore is 4  GCS verbal subscore is 5  GCS motor subscore is 6  Skin: Skin is warm and dry  No rash noted  He is not diaphoretic  Psychiatric: He has a normal mood and affect  His behavior is normal    Vitals reviewed        Vital Signs  ED Triage Vitals   Temperature Pulse Respirations Blood Pressure SpO2   10/26/19 0028 10/26/19 0029 10/26/19 0028 10/26/19 0029 10/26/19 0029   97 8 °F (36 6 °C) 82 16 115/79 99 %      Temp src Heart Rate Source Patient Position - Orthostatic VS BP Location FiO2 (%)   -- -- -- -- --             Pain Score       10/26/19 0028       4 Vitals:    10/26/19 0029   BP: 115/79   Pulse: 82         Visual Acuity      ED Medications  Medications   ketorolac (TORADOL) injection 60 mg (60 mg Intramuscular Given 10/26/19 0105)       Diagnostic Studies  Results Reviewed     None                 No orders to display              Procedures  Procedures       ED Course                               MDM  Number of Diagnoses or Management Options  Diagnosis management comments: Neurologic exam is normal   This is not a stroke  Patient has good pulses  This could be a carpal tunnel syndrome  Symptoms are worse at night  He does work with his hands  Another possibility would be some sort of cervical radiculopathy  Arthritis was also in the differential   In either case, this can be treated with NSAIDs tonight and referral back to his family doctor for definitive evaluation and treatment  Disposition  Final diagnoses:   Pain in both hands     Time reflects when diagnosis was documented in both MDM as applicable and the Disposition within this note     Time User Action Codes Description Comment    10/26/2019  1:05 AM Stepan Natalia Add [O19 041,  M79 642] Pain in both hands       ED Disposition     ED Disposition Condition Date/Time Comment    Discharge Stable Sat Oct 26, 2019  1:05 AM Dereck Rodríguez discharge to home/self care  Follow-up Information     Follow up With Specialties Details Why Contact Info    Vika Zacarias PA-C Internal Medicine, Physician Assistant In 3 days  [facility]  [address] [phone]            Patient's Medications   Discharge Prescriptions    NAPROXEN (NAPROSYN) 500 MG TABLET    Take 1 tablet (500 mg total) by mouth 2 (two) times a day with meals PRN pain       Start Date: 10/26/2019End Date: --       Order Dose: 500 mg       Quantity: 20 tablet    Refills: 0     No discharge procedures on file      ED Provider  Electronically Signed by           Taniya Alford MD  10/26/19 [facility]

## 2019-10-28 ENCOUNTER — TELEPHONE (OUTPATIENT)
Dept: FAMILY MEDICINE CLINIC | Facility: HOSPITAL | Age: 54
End: 2019-10-28

## 2019-10-28 ENCOUNTER — OFFICE VISIT (OUTPATIENT)
Dept: FAMILY MEDICINE CLINIC | Facility: HOSPITAL | Age: 54
End: 2019-10-28
Payer: COMMERCIAL

## 2019-10-28 VITALS
DIASTOLIC BLOOD PRESSURE: 78 MMHG | HEIGHT: 62 IN | WEIGHT: 129.8 LBS | HEART RATE: 90 BPM | BODY MASS INDEX: 23.89 KG/M2 | OXYGEN SATURATION: 97 % | SYSTOLIC BLOOD PRESSURE: 128 MMHG

## 2019-10-28 DIAGNOSIS — M79.641 BILATERAL HAND PAIN: ICD-10-CM

## 2019-10-28 DIAGNOSIS — M79.642 BILATERAL HAND PAIN: ICD-10-CM

## 2019-10-28 DIAGNOSIS — G60.9 IDIOPATHIC PERIPHERAL NEUROPATHY: Primary | ICD-10-CM

## 2019-10-28 DIAGNOSIS — L03.113 CELLULITIS OF RIGHT FOREARM: ICD-10-CM

## 2019-10-28 PROCEDURE — 3008F BODY MASS INDEX DOCD: CPT | Performed by: PHYSICIAN ASSISTANT

## 2019-10-28 PROCEDURE — 99213 OFFICE O/P EST LOW 20 MIN: CPT | Performed by: PHYSICIAN ASSISTANT

## 2019-10-28 RX ORDER — TRAMADOL HYDROCHLORIDE 50 MG/1
50 TABLET ORAL EVERY 6 HOURS PRN
Qty: 30 TABLET | Refills: 2 | Status: SHIPPED | OUTPATIENT
Start: 2019-10-28

## 2019-10-28 NOTE — PROGRESS NOTES
Assessment/Plan:         Diagnoses and all orders for this visit:    Idiopathic peripheral neuropathy  -     EMG 2 Limb Upper Extremity; Future    Hand pain- bilaterally-     traMADol (ULTRAM) 50 mg tablet; Take 1 tablet (50 mg total) by mouth every 6 (six) hours as needed for moderate pain        Subjective:      Patient ID: Bobby Dominguez is a 47 y o  male  47year old white male c/o hand pains past 2 weeks  Presents with wife, and toddler daughter  Works long hours, 6 days a week  Very stiff, numb and tingling  Went to Urgent care, and ER, was dx  With arthritis, and possibly carpal tunnel syndrome; works at [employer]  Feels has been overworking, very tired  Took Tylenol, and Naproxen with no relief  Has trouble concentrating on things, due to exhaustion  Admits to smoking sometimes, denies drug use, and alcohol use  Review of Systems   Constitutional: Positive for fatigue  Negative for chills, diaphoresis and fever  Tired all week long, unable to sleep  Respiratory: Positive for cough  Negative for shortness of breath  Was working in rain yesterday, has intermittent cough  Gastrointestinal: Negative for abdominal pain, nausea and vomiting  Musculoskeletal: Positive for arthralgias, back pain, myalgias, neck pain and neck stiffness  Neurological: Positive for numbness and headaches  Negative for dizziness and light-headedness  Psychiatric/Behavioral: Positive for sleep disturbance  Objective:      /78   Pulse 90   Ht 5' 2" (1 575 m)   Wt 58 9 kg (129 lb 12 8 oz)   SpO2 97%   BMI 23 74 kg/m²          Physical Exam   Constitutional: He is oriented to person, place, and time  He appears well-developed and well-nourished  No distress  Cardiovascular: Normal rate, regular rhythm and normal heart sounds  Pulmonary/Chest: Effort normal and breath sounds normal  No stridor  No respiratory distress  He has no wheezes   He has no rales    Musculoskeletal: He exhibits edema and tenderness  He exhibits no deformity  Unable to make fists with both hands, very stiff, and inflamed  Tender to palpation  Neurological: He is alert and oriented to person, place, and time  No cranial nerve deficit  Coordination normal    Skin: He is not diaphoretic  Nursing note and vitals reviewed

## 2019-10-28 NOTE — PATIENT INSTRUCTIONS
Recommend vitamin B complex, and vitamin D 4000 IU daily  Continue Naproxen and given short course of tramadol  Patient referred to get EMG to check for carpal tunnel syndrome  Given week off work  Recommend smoke cessation

## 2019-10-30 ENCOUNTER — TELEPHONE (OUTPATIENT)
Dept: FAMILY MEDICINE CLINIC | Facility: HOSPITAL | Age: 54
End: 2019-10-30

## 2019-10-30 NOTE — TELEPHONE ENCOUNTER
I do not recommend anything stronger, but he can try over the counter pain meds  Along with Tramadol or take 2 Tramadol's at one time  If he wants we can refer him to pain management

## 2019-11-15 ENCOUNTER — HOSPITAL ENCOUNTER (EMERGENCY)
Facility: HOSPITAL | Age: 54
Discharge: HOME | End: 2019-11-15
Attending: EMERGENCY MEDICINE | Admitting: EMERGENCY MEDICINE
Payer: COMMERCIAL

## 2019-11-15 VITALS
DIASTOLIC BLOOD PRESSURE: 85 MMHG | HEART RATE: 78 BPM | SYSTOLIC BLOOD PRESSURE: 142 MMHG | OXYGEN SATURATION: 100 % | WEIGHT: 129.85 LBS | TEMPERATURE: 97.2 F | RESPIRATION RATE: 20 BRPM | BODY MASS INDEX: 23.9 KG/M2 | HEIGHT: 62 IN

## 2019-11-15 DIAGNOSIS — B34.9 VIRAL SYNDROME: Primary | ICD-10-CM

## 2019-11-15 PROCEDURE — 99282 EMERGENCY DEPT VISIT SF MDM: CPT | Performed by: EMERGENCY MEDICINE

## 2019-11-15 PROCEDURE — 99283 EMERGENCY DEPT VISIT LOW MDM: CPT

## 2019-11-15 RX ORDER — GUAIFENESIN/DEXTROMETHORPHAN 100-10MG/5
10 SYRUP ORAL ONCE
Status: COMPLETED | OUTPATIENT
Start: 2019-11-15 | End: 2019-11-15

## 2019-11-15 RX ORDER — GUAIFENESIN/DEXTROMETHORPHAN 100-10MG/5
5 SYRUP ORAL 3 TIMES DAILY PRN
Qty: 118 ML | Refills: 0 | Status: SHIPPED | OUTPATIENT
Start: 2019-11-15 | End: 2020-01-02 | Stop reason: SDUPTHER

## 2019-11-15 RX ADMIN — GUAIFENESIN AND DEXTROMETHORPHAN 10 ML: 100; 10 SYRUP ORAL at 10:52

## 2019-11-15 NOTE — ED PROVIDER NOTES
History  Chief Complaint   Patient presents with    Flu Symptoms     pt presents to ED c/o flu like symptoms, runny nose, cough and body aches for past 3 days  Patient complains of general flu-like symptoms, runny nose, dry cough for 3 days now, sore throat  He has chills but didn't take his temperature  Patient did not get the flu vaccine  He took mucinex without relief  He has generalized body aches more at his joints  Denies any rashes or tick bites  Prior to Admission Medications   Prescriptions Last Dose Informant Patient Reported? Taking? PHENobarbital (LUMINAL) 97 2 MG tablet   No No   Sig: Take 1 tablet (97 2 mg total) by mouth 2 (two) times a day   albuterol (PROVENTIL HFA,VENTOLIN HFA) 90 mcg/act inhaler   Yes No   Sig: USE TWO PUFFS EVERY 4-6 HOURS AS NEEDED   carBAMazepine (TEGretol XR) 100 mg 12 hr tablet   No No   Sig: Take 1 tablet (100 mg total) by mouth 2 (two) times a day   losartan (COZAAR) 50 mg tablet   No No   Sig: Take 1 tablet (50 mg total) by mouth daily   naproxen (NAPROSYN) 500 mg tablet   No No   Sig: Take 1 tablet (500 mg total) by mouth 2 (two) times a day with meals PRN pain   traMADol (ULTRAM) 50 mg tablet   No No   Sig: Take 1 tablet (50 mg total) by mouth every 6 (six) hours as needed for moderate pain      Facility-Administered Medications: None       Past Medical History:   Diagnosis Date    Depression     Hypertension     Seizures (HCC)     [facility]traumatic [facility] injury) ([facility] [address] ) 1989       Past Surgical History:   Procedure Laterality Date    KNEE ARTHROSCOPY Right        Family History   Problem Relation Age of Onset    Heart disease Mother     Diabetes Mother     Hypertension Father     Heart disease Sister     Cancer Sister      I have reviewed and agree with the history as documented      Social History     Tobacco Use    Smoking status: Current Some Day Smoker     Packs/day: 0 25     Types: Cigarettes    Smokeless tobacco: Current User     Types: Chew   Substance Use Topics    Alcohol use: Never     Frequency: Never    Drug use: Never        Review of Systems   All other systems reviewed and are negative  Physical Exam  Physical Exam   Constitutional: He is oriented to person, place, and time  He appears well-developed and well-nourished  HENT:   Mouth/Throat: Oropharynx is clear and moist  No oropharyngeal exudate  Eyes: Pupils are equal, round, and reactive to light  Conjunctivae and EOM are normal    Neck: Normal range of motion  Neck supple  No spinous process tenderness present  Cardiovascular: Normal rate, regular rhythm, normal heart sounds and intact distal pulses  Pulmonary/Chest: Effort normal and breath sounds normal  No respiratory distress  He has no wheezes  Abdominal: Soft  Bowel sounds are normal  He exhibits no distension  There is no tenderness  Musculoskeletal: Normal range of motion  Neurological: He is alert and oriented to person, place, and time  He has normal strength  No sensory deficit  GCS eye subscore is 4  GCS verbal subscore is 5  GCS motor subscore is 6  Skin: Skin is warm and dry  No rash noted  Psychiatric: He has a normal mood and affect  Nursing note and vitals reviewed        Vital Signs  ED Triage Vitals   Temperature Pulse Respirations Blood Pressure SpO2   11/15/19 1026 11/15/19 1025 11/15/19 1025 11/15/19 1025 11/15/19 1025   (!) 97 2 °F (36 2 °C) 78 20 142/85 100 %      Temp Source Heart Rate Source Patient Position - Orthostatic VS BP Location FiO2 (%)   11/15/19 1026 11/15/19 1025 11/15/19 1025 11/15/19 1025 --   Temporal Monitor Sitting Right arm       Pain Score       --                  Vitals:    11/15/19 1025   BP: 142/85   Pulse: 78   Patient Position - Orthostatic VS: Sitting         Visual Acuity      ED Medications  Medications   dextromethorphan-guaiFENesin (ROBITUSSIN DM)  mg/5 mL oral syrup 10 mL (10 mL Oral Given 11/15/19 1052)       Diagnostic Studies  Results Reviewed None                 No orders to display              Procedures  Procedures       ED Course                               MDM  Number of Diagnoses or Management Options  Viral syndrome: new and requires workup     Amount and/or Complexity of Data Reviewed  Clinical lab tests: ordered    Patient Progress  Patient progress: improved      Disposition  Final diagnoses:   Viral syndrome     Time reflects when diagnosis was documented in both MDM as applicable and the Disposition within this note     Time User Action Codes Description Comment    11/15/2019 10:45 AM Norbert Holguin Add [B34 9] Viral syndrome       ED Disposition     ED Disposition Condition Date/Time Comment    Discharge Stable Fri Nov 15, 2019 10:45 AM Riksherry Mckeon discharge to home/self care              Follow-up Information     Follow up With Specialties Details Why Contact Info    Sonia Luu PA-C Internal Medicine, Physician Assistant  As needed, If symptoms worsen [address] [phone]            Discharge Medication List as of 11/15/2019 10:47 AM      START taking these medications    Details   dextromethorphan-guaiFENesin (ROBITUSSIN DM)  mg/5 mL syrup Take 5 mL by mouth 3 (three) times a day as needed for cough, Starting Fri 11/15/2019, Normal         CONTINUE these medications which have NOT CHANGED    Details   albuterol (PROVENTIL HFA,VENTOLIN HFA) 90 mcg/act inhaler USE TWO PUFFS EVERY 4-6 HOURS AS NEEDED, Historical Med      carBAMazepine (TEGretol XR) 100 mg 12 hr tablet Take 1 tablet (100 mg total) by mouth 2 (two) times a day, Starting Mon 9/16/2019, Until Mon 10/28/2019, Normal      losartan (COZAAR) 50 mg tablet Take 1 tablet (50 mg total) by mouth daily, Starting Mon 9/16/2019, Until Mon 10/28/2019, Normal      naproxen (NAPROSYN) 500 mg tablet Take 1 tablet (500 mg total) by mouth 2 (two) times a day with meals PRN pain, Starting Sat 10/26/2019, Print      PHENobarbital (LUMINAL) 97 2 MG tablet Take 1 tablet (97 2 mg total) by mouth 2 (two) times a day, Starting Mon 9/16/2019, Until Mon 10/28/2019, Normal      traMADol (ULTRAM) 50 mg tablet Take 1 tablet (50 mg total) by mouth every 6 (six) hours as needed for moderate pain, Starting Mon 10/28/2019, Normal           No discharge procedures on file      ED Provider  Electronically Signed by           Isauro Ott DO  11/15/19 4228

## 2019-11-20 ENCOUNTER — VBI (OUTPATIENT)
Dept: FAMILY MEDICINE CLINIC | Facility: HOSPITAL | Age: 54
End: 2019-11-20

## 2019-11-20 NOTE — LETTER
[facility]  [address] [place] [address]    Date: 11/21/19  [facility]  [address]  [address] [address]    Dear Jacoby Elder:                                                                                                                              Thank you for choosing [facility] emergency department for care  As your primary care provider we want to make sure that your ongoing medical care is being addressed  If you require follow up care as a result of your emergency department visit, there are a few things we would like you to know  As part of our continuing commitment to caring for our patient, we have added more same day appointments and have extended our office hours to meet your medical needs  After hours, on-call physicians are available via our main office line  We encourage you to contact our office prior to seeking treatment to discuss your symptoms with our medical staff  Together, we can determine the correct course of action  A majority of non-emergent conditions such as: common cold, flu-like symptoms, fevers, strains/sprains, dislocations, minor burns, cuts and animal bites can be treated at [address] [place] [address] facilities  Diagnostic testing is available at some sites  Of course, if you are experiencing a life threatening medical emergency call 911 or proceed directly to the nearest emergency room  Your nearest [address] [place] [address] facility is conveniently located at:    [address] [place] [address]  [address] [place] [address], [address] [place] [address] Road  [phone]      SKIP THE WAIT  Conveniently offered at most [facility] locations  [place]thiafort your spot online at www [facility] [email or web address] or on the [facility]    Sincerely,    [address] [facility]  Dept: [phone]

## 2019-11-20 NOTE — TELEPHONE ENCOUNTER
Victoriano Getting    ED Visit Information     Ed visit date: 11/15/2019  Diagnosis Description:  Viral syndrome  In Network? Yes Keyla Pineda  Discharge status: Home  Discharged with meds ? Yes  Number of ED visits to date: 8  ED Severity:n/a     Outreach Information    Outreach successful: Yes 2  Date letter mailed:11/21/2019  Date Finalized:11/21/2019    Care Coordination    Follow up appointment with pcp: no No ED f/u appt scheduled  Transportation issues ? NA    [employer] Outreach    Outreach type:  7 Day Outreach  Emergent necessity warranted by diagnosis:  No  11/20/2019 10:13 AM Phone (Jia Hackett) Demi Starr (Self) [phone] (H)   Left Message  Unable to reach patient regarding recent ED visit on 11/15 for Viral syndrome  Patient was discharged with dextromethorphan-guaifenesin and advised to follow up with PCP  2nd attempt will be made on 11/21/2019 11/21/2019 12:40 PM Phone (Jia Hackett) Demi Starr (Self) [phone] (H)   Left Message  Unable to reach patient regarding recent ED visit on 11/15 for Viral syndrome  Patient was discharged with dextromethorphan-guaifenesin and advised to follow up with PCP  Letter generated and mailed

## 2020-01-02 ENCOUNTER — APPOINTMENT (EMERGENCY)
Dept: RADIOLOGY | Facility: HOSPITAL | Age: 55
End: 2020-01-02
Payer: COMMERCIAL

## 2020-01-02 ENCOUNTER — HOSPITAL ENCOUNTER (EMERGENCY)
Facility: HOSPITAL | Age: 55
Discharge: HOME | End: 2020-01-02
Attending: EMERGENCY MEDICINE | Admitting: EMERGENCY MEDICINE
Payer: COMMERCIAL

## 2020-01-02 VITALS
HEART RATE: 84 BPM | OXYGEN SATURATION: 99 % | TEMPERATURE: 98.1 F | SYSTOLIC BLOOD PRESSURE: 148 MMHG | RESPIRATION RATE: 18 BRPM | DIASTOLIC BLOOD PRESSURE: 70 MMHG

## 2020-01-02 DIAGNOSIS — B34.9 VIRAL SYNDROME: ICD-10-CM

## 2020-01-02 DIAGNOSIS — J06.9 VIRAL URI WITH COUGH: Primary | ICD-10-CM

## 2020-01-02 PROCEDURE — 99284 EMERGENCY DEPT VISIT MOD MDM: CPT | Performed by: PHYSICIAN ASSISTANT

## 2020-01-02 PROCEDURE — 99283 EMERGENCY DEPT VISIT LOW MDM: CPT

## 2020-01-02 PROCEDURE — 71046 X-RAY EXAM CHEST 2 VIEWS: CPT

## 2020-01-02 RX ORDER — GUAIFENESIN/DEXTROMETHORPHAN 100-10MG/5
5 SYRUP ORAL 3 TIMES DAILY PRN
Qty: 118 ML | Refills: 0 | Status: SHIPPED | OUTPATIENT
Start: 2020-01-02

## 2020-01-02 NOTE — ED PROVIDER NOTES
History  Chief Complaint   Patient presents with    Cough     pt had the "flu" a few weeks ago still has a cough and poor appetite     Patient is a 48 y/o M that presents to the ED with cough that started 2 days ago  He states he is coughing up yellow mucus  He has rhinorrhea  No fevers, but has chills  No sick contacts  He does have h/o asthma  He states he has generalized weakness  History provided by:  Patient  Cough   Cough characteristics:  Productive  Sputum characteristics:  Yellow  Severity:  Moderate  Onset quality:  Gradual  Duration:  2 days  Timing:  Constant  Progression:  Unchanged  Chronicity:  New  Smoker: yes    Context: upper respiratory infection    Relieved by:  Nothing  Worsened by:  Nothing  Ineffective treatments:  None tried  Associated symptoms: chills and rhinorrhea    Associated symptoms: no chest pain, no ear pain, no fever, no rash, no shortness of breath, no sinus congestion and no weight loss        Prior to Admission Medications   Prescriptions Last Dose Informant Patient Reported? Taking?    PHENobarbital (LUMINAL) 97 2 MG tablet   No No   Sig: Take 1 tablet (97 2 mg total) by mouth 2 (two) times a day   albuterol (PROVENTIL HFA,VENTOLIN HFA) 90 mcg/act inhaler   Yes No   Sig: USE TWO PUFFS EVERY 4-6 HOURS AS NEEDED   carBAMazepine (TEGretol XR) 100 mg 12 hr tablet   No No   Sig: Take 1 tablet (100 mg total) by mouth 2 (two) times a day   dextromethorphan-guaiFENesin (ROBITUSSIN DM)  mg/5 mL syrup   No No   Sig: Take 5 mL by mouth 3 (three) times a day as needed for cough   dextromethorphan-guaiFENesin (ROBITUSSIN DM)  mg/5 mL syrup   No No   Sig: Take 5 mL by mouth 3 (three) times a day as needed for cough   losartan (COZAAR) 50 mg tablet   No No   Sig: Take 1 tablet (50 mg total) by mouth daily   naproxen (NAPROSYN) 500 mg tablet   No No   Sig: Take 1 tablet (500 mg total) by mouth 2 (two) times a day with meals PRN pain   traMADol (ULTRAM) 50 mg tablet   No No   Sig: Take 1 tablet (50 mg total) by mouth every 6 (six) hours as needed for moderate pain      Facility-Administered Medications: None       Past Medical History:   Diagnosis Date    Depression     Hypertension     Seizures (HCC)     TBI (traumatic brain injury) ([facility] [address] ) 1989       Past Surgical History:   Procedure Laterality Date    KNEE ARTHROSCOPY Right        Family History   Problem Relation Age of Onset    Heart disease Mother     Diabetes Mother     Hypertension Father     Heart disease Sister     Cancer Sister      I have reviewed and agree with the history as documented  Social History     Tobacco Use    Smoking status: Current Some Day Smoker     Packs/day: 0 25     Types: Cigarettes    Smokeless tobacco: Current User     Types: Chew   Substance Use Topics    Alcohol use: Never     Frequency: Never    Drug use: Never        Review of Systems   Constitutional: Positive for chills  Negative for fever and weight loss  HENT: Positive for rhinorrhea  Negative for ear pain  Respiratory: Positive for cough  Negative for shortness of breath  Cardiovascular: Negative for chest pain and leg swelling  Skin: Negative for color change and rash  Neurological: Positive for weakness  Negative for dizziness and numbness  Psychiatric/Behavioral: Negative for confusion  All other systems reviewed and are negative  Physical Exam  Physical Exam   Constitutional: He is oriented to person, place, and time  He appears well-developed and well-nourished  He is cooperative  He does not appear ill  No distress  HENT:   Head: Normocephalic and atraumatic  Nose: Nose normal    Mouth/Throat: Oropharynx is clear and moist and mucous membranes are normal    Eyes: Conjunctivae are normal    Neck: Normal range of motion  Cardiovascular: Normal rate, regular rhythm and normal heart sounds  No murmur heard  Pulmonary/Chest: Effort normal  He has decreased breath sounds  He has no wheezes   He has rhonchi in the right lower field and the left lower field  He has no rales  Musculoskeletal: Normal range of motion  He exhibits no edema  Neurological: He is alert and oriented to person, place, and time  He has normal strength  No sensory deficit  Gait normal    Skin: Skin is warm and dry  No rash noted  He is not diaphoretic  No pallor  Nursing note and vitals reviewed  Vital Signs  ED Triage Vitals [01/02/20 1443]   Temperature Pulse Respirations Blood Pressure SpO2   98 1 °F (36 7 °C) 84 18 148/70 99 %      Temp Source Heart Rate Source Patient Position - Orthostatic VS BP Location FiO2 (%)   Temporal -- Sitting Left arm --      Pain Score       8           Vitals:    01/02/20 1443   BP: 148/70   Pulse: 84   Patient Position - Orthostatic VS: Sitting         Visual Acuity      ED Medications  Medications - No data to display    Diagnostic Studies  Results Reviewed     None                 XR chest 2 views   ED Interpretation by Marco Yoon PA-C (01/02 1713)   No acute abnormalities  Procedures  Procedures         ED Course                               MDM  Number of Diagnoses or Management Options  Viral URI with cough: new and requires workup  Diagnosis management comments: Patient with cough x 2 days, will order cxr to r/o pneumonia           Amount and/or Complexity of Data Reviewed  Tests in the radiology section of CPT®: ordered and reviewed  Independent visualization of images, tracings, or specimens: yes    Patient Progress  Patient progress: stable        Disposition  Final diagnoses:   Viral URI with cough     Time reflects when diagnosis was documented in both MDM as applicable and the Disposition within this note     Time User Action Codes Description Comment    1/2/2020  5:16 PM Beth Cuff Add [R05] Cough     1/2/2020  5:16 PM Midway Muta [R05] Cough     1/2/2020  5:16 PM Beth Cuff Add [J06 9,  B97 89] Viral URI with cough 1/2/2020  5:16 PM Darrelyn Rinne Add [B34 9] Viral syndrome       ED Disposition     ED Disposition Condition Date/Time Comment    Discharge Stable Thu Jan 2, 2020  5:16 PM Angle Gutierrez discharge to home/self care  Follow-up Information     Follow up With Specialties Details Why Contact Nora Duncan PA-C Internal Medicine, Physician Assistant Call in 1 week As needed, For recheck [facility]  [address] [phone]            Discharge Medication List as of 1/2/2020  5:18 PM      CONTINUE these medications which have CHANGED    Details   dextromethorphan-guaiFENesin (ROBITUSSIN DM)  mg/5 mL syrup Take 5 mL by mouth 3 (three) times a day as needed for cough, Starting u 1/2/2020, Normal         CONTINUE these medications which have NOT CHANGED    Details   albuterol (PROVENTIL HFA,VENTOLIN HFA) 90 mcg/act inhaler USE TWO PUFFS EVERY 4-6 HOURS AS NEEDED, Historical Med      carBAMazepine (TEGretol XR) 100 mg 12 hr tablet Take 1 tablet (100 mg total) by mouth 2 (two) times a day, Starting Mon 9/16/2019, Until Mon 10/28/2019, Normal      losartan (COZAAR) 50 mg tablet Take 1 tablet (50 mg total) by mouth daily, Starting Mon 9/16/2019, Until Mon 10/28/2019, Normal      naproxen (NAPROSYN) 500 mg tablet Take 1 tablet (500 mg total) by mouth 2 (two) times a day with meals PRN pain, Starting Sat 10/26/2019, Print      PHENobarbital (LUMINAL) 97 2 MG tablet Take 1 tablet (97 2 mg total) by mouth 2 (two) times a day, Starting Mon 9/16/2019, Until Mon 10/28/2019, Normal      traMADol (ULTRAM) 50 mg tablet Take 1 tablet (50 mg total) by mouth every 6 (six) hours as needed for moderate pain, Starting Mon 10/28/2019, Normal           No discharge procedures on file      ED Provider  Electronically Signed by           Rob Moffett PA-C  01/02/20 7031

## 2020-01-02 NOTE — DISCHARGE INSTRUCTIONS
Rest, increase fluids  Use your inhaler every 4 hours as needed for cough  Cough medicine as directed  Follow up with family doctor if no improvement in 5-7 days  Stop smoking

## 2020-01-10 DIAGNOSIS — G40.919 BREAKTHROUGH SEIZURE (HCC): ICD-10-CM

## 2020-01-10 RX ORDER — CARBAMAZEPINE 100 MG/1
CAPSULE, EXTENDED RELEASE ORAL
Qty: 60 CAPSULE | Refills: 0 | Status: SHIPPED | OUTPATIENT
Start: 2020-01-10

## 2020-01-13 DIAGNOSIS — G40.919 BREAKTHROUGH SEIZURE (HCC): ICD-10-CM

## 2020-01-13 RX ORDER — PHENOBARBITAL 97.2 MG/1
97.2 TABLET ORAL 2 TIMES DAILY
Qty: 60 TABLET | Refills: 0 | Status: SHIPPED | OUTPATIENT
Start: 2020-01-13 | End: 2020-02-05

## 2020-02-05 DIAGNOSIS — G40.919 BREAKTHROUGH SEIZURE (HCC): ICD-10-CM

## 2020-02-05 RX ORDER — PHENOBARBITAL 97.2 MG/1
97.2 TABLET ORAL 2 TIMES DAILY
Qty: 60 TABLET | Refills: 0 | Status: SHIPPED | OUTPATIENT
Start: 2020-02-05 | End: 2020-02-05 | Stop reason: SDUPTHER

## 2020-02-05 RX ORDER — PHENOBARBITAL 97.2 MG/1
97.2 TABLET ORAL 2 TIMES DAILY
Qty: 60 TABLET | Refills: 3 | Status: SHIPPED | OUTPATIENT
Start: 2020-02-05 | End: 2020-08-10

## 2020-02-07 ENCOUNTER — DOCUMENTATION (OUTPATIENT)
Dept: OTHER | Facility: HOSPITAL | Age: 55
End: 2020-02-07

## 2020-04-05 ENCOUNTER — HOSPITAL ENCOUNTER (EMERGENCY)
Facility: HOSPITAL | Age: 55
Discharge: HOME | End: 2020-04-05
Attending: EMERGENCY MEDICINE | Admitting: EMERGENCY MEDICINE
Payer: COMMERCIAL

## 2020-04-05 ENCOUNTER — APPOINTMENT (EMERGENCY)
Dept: CT IMAGING | Facility: HOSPITAL | Age: 55
End: 2020-04-05
Payer: COMMERCIAL

## 2020-04-05 VITALS
OXYGEN SATURATION: 96 % | TEMPERATURE: 98 F | HEART RATE: 82 BPM | SYSTOLIC BLOOD PRESSURE: 108 MMHG | HEIGHT: 62 IN | RESPIRATION RATE: 17 BRPM | WEIGHT: 120.37 LBS | BODY MASS INDEX: 22.15 KG/M2 | DIASTOLIC BLOOD PRESSURE: 69 MMHG

## 2020-04-05 DIAGNOSIS — S00.81XA ABRASION OF FOREHEAD, INITIAL ENCOUNTER: ICD-10-CM

## 2020-04-05 DIAGNOSIS — S00.83XA CONTUSION OF FACE, INITIAL ENCOUNTER: ICD-10-CM

## 2020-04-05 DIAGNOSIS — S01.01XA LACERATION OF SCALP, INITIAL ENCOUNTER: Primary | ICD-10-CM

## 2020-04-05 DIAGNOSIS — R51.9 HEADACHE: ICD-10-CM

## 2020-04-05 PROCEDURE — 70450 CT HEAD/BRAIN W/O DYE: CPT

## 2020-04-05 PROCEDURE — 99284 EMERGENCY DEPT VISIT MOD MDM: CPT | Performed by: EMERGENCY MEDICINE

## 2020-04-05 PROCEDURE — 96374 THER/PROPH/DIAG INJ IV PUSH: CPT

## 2020-04-05 PROCEDURE — 12001 RPR S/N/AX/GEN/TRNK 2.5CM/<: CPT | Performed by: EMERGENCY MEDICINE

## 2020-04-05 PROCEDURE — 96361 HYDRATE IV INFUSION ADD-ON: CPT

## 2020-04-05 PROCEDURE — 96375 TX/PRO/DX INJ NEW DRUG ADDON: CPT

## 2020-04-05 PROCEDURE — 99284 EMERGENCY DEPT VISIT MOD MDM: CPT

## 2020-04-05 RX ORDER — DIPHENHYDRAMINE HYDROCHLORIDE 50 MG/ML
25 INJECTION INTRAMUSCULAR; INTRAVENOUS ONCE
Status: COMPLETED | OUTPATIENT
Start: 2020-04-05 | End: 2020-04-05

## 2020-04-05 RX ORDER — ACETAMINOPHEN 325 MG/1
650 TABLET ORAL ONCE
Status: COMPLETED | OUTPATIENT
Start: 2020-04-05 | End: 2020-04-05

## 2020-04-05 RX ORDER — METOCLOPRAMIDE HYDROCHLORIDE 5 MG/ML
10 INJECTION INTRAMUSCULAR; INTRAVENOUS ONCE
Status: COMPLETED | OUTPATIENT
Start: 2020-04-05 | End: 2020-04-05

## 2020-04-05 RX ORDER — GINSENG 100 MG
1 CAPSULE ORAL ONCE
Status: COMPLETED | OUTPATIENT
Start: 2020-04-05 | End: 2020-04-05

## 2020-04-05 RX ORDER — LIDOCAINE HYDROCHLORIDE AND EPINEPHRINE 10; 10 MG/ML; UG/ML
1 INJECTION, SOLUTION INFILTRATION; PERINEURAL ONCE
Status: COMPLETED | OUTPATIENT
Start: 2020-04-05 | End: 2020-04-05

## 2020-04-05 RX ADMIN — METOCLOPRAMIDE 10 MG: 5 INJECTION, SOLUTION INTRAMUSCULAR; INTRAVENOUS at 18:40

## 2020-04-05 RX ADMIN — ACETAMINOPHEN 650 MG: 325 TABLET ORAL at 17:43

## 2020-04-05 RX ADMIN — LIDOCAINE HYDROCHLORIDE AND EPINEPHRINE 1 ML: 10; 10 INJECTION, SOLUTION INFILTRATION; PERINEURAL at 17:43

## 2020-04-05 RX ADMIN — DIPHENHYDRAMINE HYDROCHLORIDE 25 MG: 50 INJECTION INTRAMUSCULAR; INTRAVENOUS at 18:40

## 2020-04-05 RX ADMIN — SODIUM CHLORIDE 1000 ML: 0.9 INJECTION, SOLUTION INTRAVENOUS at 18:39

## 2020-04-05 RX ADMIN — BACITRACIN 1 SMALL APPLICATION: 500 OINTMENT TOPICAL at 18:41

## 2020-08-08 DIAGNOSIS — G40.919 BREAKTHROUGH SEIZURE (HCC): ICD-10-CM

## 2020-08-10 RX ORDER — PHENOBARBITAL 97.2 MG/1
TABLET ORAL
Qty: 60 TABLET | Refills: 2 | Status: SHIPPED | OUTPATIENT
Start: 2020-08-10

## 2020-11-09 DIAGNOSIS — G40.919 BREAKTHROUGH SEIZURE (HCC): ICD-10-CM

## 2020-11-09 RX ORDER — PHENOBARBITAL 97.2 MG/1
TABLET ORAL
Qty: 60 TABLET | Refills: 2 | OUTPATIENT
Start: 2020-11-09

## 2022-01-29 ENCOUNTER — APPOINTMENT (EMERGENCY)
Dept: RADIOLOGY | Facility: HOSPITAL | Age: 57
End: 2022-01-29
Payer: COMMERCIAL

## 2022-01-29 ENCOUNTER — HOSPITAL ENCOUNTER (EMERGENCY)
Facility: HOSPITAL | Age: 57
Discharge: HOME | End: 2022-01-29
Attending: EMERGENCY MEDICINE | Admitting: EMERGENCY MEDICINE
Payer: COMMERCIAL

## 2022-01-29 VITALS
TEMPERATURE: 98.1 F | WEIGHT: 140 LBS | HEIGHT: 62 IN | BODY MASS INDEX: 25.76 KG/M2 | DIASTOLIC BLOOD PRESSURE: 90 MMHG | RESPIRATION RATE: 20 BRPM | SYSTOLIC BLOOD PRESSURE: 165 MMHG | OXYGEN SATURATION: 98 % | HEART RATE: 113 BPM

## 2022-01-29 DIAGNOSIS — S02.2XXA CLOSED DISPLACED FRACTURE OF NASAL BONE, INITIAL ENCOUNTER: Primary | ICD-10-CM

## 2022-01-29 DIAGNOSIS — S86.912A KNEE STRAIN, LEFT, INITIAL ENCOUNTER: ICD-10-CM

## 2022-01-29 DIAGNOSIS — S90.111A: ICD-10-CM

## 2022-01-29 PROCEDURE — 73564 X-RAY EXAM KNEE 4 OR MORE: CPT

## 2022-01-29 PROCEDURE — 73660 X-RAY EXAM OF TOE(S): CPT

## 2022-01-29 PROCEDURE — 99284 EMERGENCY DEPT VISIT MOD MDM: CPT

## 2022-01-29 PROCEDURE — 99284 EMERGENCY DEPT VISIT MOD MDM: CPT | Performed by: EMERGENCY MEDICINE

## 2022-01-29 PROCEDURE — 70160 X-RAY EXAM OF NASAL BONES: CPT

## 2022-01-29 PROCEDURE — 96372 THER/PROPH/DIAG INJ SC/IM: CPT

## 2022-01-29 RX ORDER — KETOROLAC TROMETHAMINE 30 MG/ML
30 INJECTION, SOLUTION INTRAMUSCULAR; INTRAVENOUS ONCE
Status: COMPLETED | OUTPATIENT
Start: 2022-01-29 | End: 2022-01-29

## 2022-01-29 RX ADMIN — KETOROLAC TROMETHAMINE 30 MG: 30 INJECTION, SOLUTION INTRAMUSCULAR; INTRAVENOUS at 15:35
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
## 2022-01-29 NOTE — Clinical Note
Samaria Victoria was seen and treated in our emergency department on 1/29/2022  Diagnosis: Nasal fracture  Facial abrasion  Left knee sprain, right foot contusion  Lesvia Gomez  may return to work on return date  He may return on this date: 02/01/2022         If you have any questions or concerns, please don't hesitate to call        Allen Gray, DO    ______________________________           _______________          _______________  Hospital Representative                              Date                                Time

## 2022-01-29 NOTE — DISCHARGE INSTRUCTIONS
Wash the facial abrasion at least once a day and apply antibiotic ointment to the dry wound daily for the next 3-4 days  Sleep with head and shoulders elevated on extra pillows or in a recliner tonight and tomorrow night to reduce facial swelling  If you get a nose bleed spray saline nasal spray in both nostrils 4 to 5 times a day  Ice and elevate the knee and the foot as needed for pain  Take ibuprofen or Aleve for pain as directed  Apply the Ace wrap to the left knee fracture support as needed when you are ambulating  Schedule appointment with the ear nose throat doctor Monday morning  Tell him that we want you to be seen Monday or Tuesday because the of a nasal fracture that is deforming your nose